# Patient Record
Sex: MALE | Race: WHITE | NOT HISPANIC OR LATINO | Employment: FULL TIME | URBAN - METROPOLITAN AREA
[De-identification: names, ages, dates, MRNs, and addresses within clinical notes are randomized per-mention and may not be internally consistent; named-entity substitution may affect disease eponyms.]

---

## 2017-11-08 ENCOUNTER — ALLSCRIPTS OFFICE VISIT (OUTPATIENT)
Dept: OTHER | Facility: OTHER | Age: 32
End: 2017-11-08

## 2017-11-08 ENCOUNTER — GENERIC CONVERSION - ENCOUNTER (OUTPATIENT)
Dept: OTHER | Facility: OTHER | Age: 32
End: 2017-11-08

## 2017-11-08 DIAGNOSIS — Z13.1 ENCOUNTER FOR SCREENING FOR DIABETES MELLITUS: ICD-10-CM

## 2017-11-08 DIAGNOSIS — Z13.220 ENCOUNTER FOR SCREENING FOR LIPOID DISORDERS: ICD-10-CM

## 2017-11-08 DIAGNOSIS — R07.9 CHEST PAIN: ICD-10-CM

## 2017-11-09 NOTE — PROGRESS NOTES
Assessment    1  Social drinker (V49 89) (Z78 9)   2  History of Cholecystectomy   3  History of chest pain (V13 89) (Z87 898)   4  Chest pain (786 50) (R07 9)   5  ADHD (attention deficit hyperactivity disorder) (314 01) (F90 9)   6  Depression (311) (F32 9)   7  Swelling of joint of right wrist (719 03) (M25 431)    Plan  Chest pain    · EKG/ECG- POC; Status:Complete;   Done: 55KMY1313 10:26AM    Discussion/Summary  Discussion Summary:   Chest pain- less likely cardiac in nature per history now resolved, normal EKG, he was advised if symptoms return to make an appt to be evaluated again  stable at this time advised to follow up with psychiatrist at this timewrist swelling- advised to monitor only at this time  up as needed  Medication SE Review and Pt Understands Tx: Possible side effects of new medications were reviewed with the patient/guardian today  The treatment plan was reviewed with the patient/guardian  The patient/guardian understands and agrees with the treatment plan      Chief Complaint  Chief Complaint Free Text Note Form: Patient is here for initial office visit  He stated he had chest pain about 2 weeks ago  History of Present Illness  HPI: Patient is here establish care due t chest pain, he says that he had a 6/10 chest pain that lasted 10 minutes that occurred 3 weeks ago only once, sharp and stabbing pain that occurred while he was standing at work  He denies any associated symptoms related to it sees a psychiatrist, Dr Lowell Price for ADHD and depression, he takes Wellbutrin 100 mg PO once daily  He also takes cholestyramine due to gallbladder removal he has noticed a lump on his right wrist that he noticed some numbness and tingling which went away  Review of Systems  Complete-Male:  Constitutional: No fever or chills, feels well, no tiredness, no recent weight gain or weight loss  Eyes: No complaints of eye pain, no red eyes, no discharge from eyes, no itchy eyes    Cardiovascular: chest pain, but-- as noted in HPI  Respiratory: No complaints of shortness of breath, no wheezing, no cough, no SOB on exertion, no orthopnea or PND  Gastrointestinal: No complaints of abdominal pain, no constipation, no nausea or vomiting, no diarrhea or bloody stools  Musculoskeletal: as noted in HPI  Integumentary: No complaints of skin rash or skin lesions, no itching, no skin wound, no dry skin  Neurological: No compliants of headache, no confusion, no convulsions, no numbness or tingling, no dizziness or fainting, no limb weakness, no difficulty walking  Psychiatric: anxiety, but-- as noted in HPI  Endocrine: No complaints of proptosis, no hot flashes, no muscle weakness, no erectile dysfunction, no deepening of the voice, no feelings of weakness  Hematologic/Lymphatic: No complaints of swollen glands, no swollen glands in the neck, does not bleed easily, no easy bruising  ROS Reviewed:   ROS reviewed  Past Medical History  1  History of chest pain (V13 89) (T11 978)  Active Problems And Past Medical History Reviewed: The active problems and past medical history were reviewed and updated today  Surgical History  1  History of Cholecystectomy  Surgical History Reviewed: The surgical history was reviewed and updated today  Social History     · Never a smoker   · Social drinker (V49 89) (Z78 9)  Social History Reviewed: The social history was reviewed and updated today  The social history was reviewed and is unchanged  Current Meds   1  Cholestyramine 4 GM Oral Packet; Therapy: (Recorded:40Kwh1603) to Recorded   2  Wellbutrin  MG Oral Tablet Extended Release 12 Hour Recorded    Allergies  1   No Known Drug Allergies    Vitals  Vital Signs    Recorded: [id number] 10:17AM   Temperature 98 F   Heart Rate 84   Systolic 331   Diastolic 80   Height 5 ft 9 5 in   Weight 246 lb 7 04 oz   BMI Calculated 35 87   BSA Calculated 2 27   O2 Saturation 98       Physical Exam   Constitutional General appearance: No acute distress, well appearing and well nourished  Eyes  Conjunctiva and lids: No swelling, erythema, or discharge  Pulmonary  Respiratory effort: No increased work of breathing or signs of respiratory distress  Auscultation of lungs: Clear to auscultation, equal breath sounds bilaterally, no wheezes, no rales, no rhonci  Cardiovascular  Auscultation of heart: Normal rate and rhythm, normal S1 and S2, without murmurs  Musculoskeletal  Gait and station: Normal    Skin  Skin and subcutaneous tissue: Normal without rashes or lesions  Psychiatric  Orientation to person, place and time: Normal    Mood and affect: Normal          Results/Data  EKG/ECG- POC [id number] 10:26AM [facility] [facility]     Test Name Result Flag Reference   EKG/ECG see report         ECG: A 12 lead ECG was performed and was normal   Rhythm and rate: normal sinus rhythm  P-waves: the P wave is normal   QRS: the QRS is normal   ST segment: the ST segments are normal   Comparison to prior ECGs:  no prior ECGs were available for comparison        Signatures   Electronically signed by : Shabana Fuchs MD; Nov 8 2017 11:13AM EST                       (Author)

## 2018-01-13 VITALS
TEMPERATURE: 98 F | SYSTOLIC BLOOD PRESSURE: 122 MMHG | HEIGHT: 70 IN | BODY MASS INDEX: 35.28 KG/M2 | OXYGEN SATURATION: 98 % | WEIGHT: 246.44 LBS | DIASTOLIC BLOOD PRESSURE: 80 MMHG | HEART RATE: 84 BPM

## 2018-02-26 DIAGNOSIS — R07.89 OTHER CHEST PAIN: Primary | ICD-10-CM

## 2018-04-05 RX ORDER — BUPROPION HYDROCHLORIDE 100 MG/1
TABLET, EXTENDED RELEASE ORAL
COMMUNITY
End: 2022-01-19

## 2018-04-05 RX ORDER — CHOLESTYRAMINE 4 G/9G
POWDER, FOR SUSPENSION ORAL
COMMUNITY
End: 2022-01-19

## 2018-04-10 ENCOUNTER — OFFICE VISIT (OUTPATIENT)
Dept: GASTROENTEROLOGY | Facility: CLINIC | Age: 33
End: 2018-04-10
Payer: COMMERCIAL

## 2018-04-10 VITALS
SYSTOLIC BLOOD PRESSURE: 118 MMHG | WEIGHT: 235.5 LBS | BODY MASS INDEX: 34.28 KG/M2 | DIASTOLIC BLOOD PRESSURE: 90 MMHG | HEART RATE: 94 BPM

## 2018-04-10 DIAGNOSIS — R07.89 OTHER CHEST PAIN: ICD-10-CM

## 2018-04-10 DIAGNOSIS — R07.9 CHEST PAIN, UNSPECIFIED TYPE: Primary | ICD-10-CM

## 2018-04-10 PROCEDURE — 99244 OFF/OP CNSLTJ NEW/EST MOD 40: CPT | Performed by: INTERNAL MEDICINE

## 2018-04-10 RX ORDER — DEXTROAMPHETAMINE SACCHARATE, AMPHETAMINE ASPARTATE MONOHYDRATE, DEXTROAMPHETAMINE SULFATE AND AMPHETAMINE SULFATE 7.5; 7.5; 7.5; 7.5 MG/1; MG/1; MG/1; MG/1
30 CAPSULE, EXTENDED RELEASE ORAL EVERY MORNING
COMMUNITY
End: 2022-01-19

## 2018-04-10 NOTE — LETTER
April 10, 2018     Burak Desai, [address]  [address] [facility]  [address]    Patient: Bharati Orr   YOB: 1985   Date of Visit: 4/10/2018       Dear Dr Lauren Dockery:    Thank you for referring Bharati Orr to me for evaluation  Below are my notes for this consultation  If you have questions, please do not hesitate to call me  I look forward to following your patient along with you  Sincerely,        Ivana Elias MD        CC: No Recipients  Ivana Elias MD  4/10/2018  2:35 PM  Sign at close encounter  Consultation - [address] [facility]  Bharati Orr 1985 34 y o  male     ASSESSMENT @ PLAN:   50-year-old male with an episode last month of 3 days of chest pain  He does not have overt peptic symptoms and rarely has chest pain  He had negative cardiovascular evaluation he does have a bile salt diarrhea after getting his gallbladder out 5 years ago and has to take cholestyramine  1 will do EGD to investigate    2 I told him that he needs to improve his diet  I will need to talk to his wife about this at the procedure    Chief Complaint:  Chest pain    HPI:  He is a 50-year-old man with not eating cardiac chest pain  He had 3 days of severe chest pain and went to the emergency room and was evaluated  He reports that he had a negative cardiovascular evaluation  It is not exertional   He has no dysphagia nausea vomiting  He does have a very sensitive stomach reports that he has diarrhea if he eats bad food he does cholestyramine for bile salt diarrhea after getting his gallbladder out 5 years ago  He has no heartburn dysphagia  He has no NSAID use  He has gained weight  He has nobody in the family with Crohn's disease or ulcerative colitis  He did not get a PPI trial   He eats very bad food  REVIEW OF SYSTEMS:     CONSTITUTIONAL: Denies any fever, chills, or rigors  Good appetite, and no recent weight loss  HEENT: No earache or tinnitus  Denies hearing loss or visual disturbances  CARDIOVASCULAR: No chest pain or palpitations  RESPIRATORY: Denies any cough, hemoptysis, shortness of breath or dyspnea on exertion  GASTROINTESTINAL: As noted in the History of Present Illness  GENITOURINARY: No problems with urination  Denies any hematuria or dysuria  NEUROLOGIC: No dizziness or vertigo, denies headaches  MUSCULOSKELETAL: Denies any muscle or joint pain  SKIN: Denies skin rashes or itching  ENDOCRINE: Denies excessive thirst  Denies intolerance to heat or cold  PSYCHOSOCIAL: Denies depression or anxiety  Denies any recent memory loss  History reviewed  No pertinent past medical history  Past Surgical History:   Procedure Laterality Date    CHOLECYSTECTOMY       Social History     Social History    Marital status: /Civil Union     Spouse name: N/A    Number of children: N/A    Years of education: N/A     Occupational History    Not on file  Social History Main Topics    Smoking status: Never Smoker    Smokeless tobacco: Never Used    Alcohol use Yes      Comment: occasional    Drug use: Yes     Types: Marijuana    Sexual activity: Not on file     Other Topics Concern    Not on file     Social History Narrative    No narrative on file     Family History   Problem Relation Age of Onset    No Known Problems Mother     No Known Problems Father      Patient has no known allergies  Current Outpatient Prescriptions   Medication Sig Dispense Refill    amphetamine-dextroamphetamine (ADDERALL XR, 30MG,) 30 MG 24 hr capsule Take 30 mg by mouth every morning      buPROPion (WELLBUTRIN SR) 100 mg 12 hr tablet Take by mouth      cholestyramine (QUESTRAN) 4 GM/DOSE powder Take by mouth       No current facility-administered medications for this visit  Blood pressure 118/90, pulse 94, weight 107 kg (235 lb 8 oz)      PHYSICAL EXAM:     General Appearance:   Alert, cooperative, no distress, appears stated age  HEENT:   Normocephalic, atraumatic, anicteric      Neck:  Supple, symmetrical, trachea midline, no adenopathy;    thyroid: no enlargement/tenderness/nodules; no carotid  bruit or JVD    Lungs:   Clear to auscultation bilaterally; no rales, rhonchi or wheezing; respirations unlabored    Heart[de-identified]   S1 and S2 normal; regular rate and rhythm; no murmur, rub, or gallop     Abdomen:   Soft, non-tender, non-distended; normal bowel sounds; no masses, no organomegaly    Genitalia:   Deferred    Rectal:   Deferred    Extremities:  No cyanosis, clubbing or edema    Pulses:  2+ and symmetric all extremities    Skin:  Skin color, texture, turgor normal, no rashes or lesions    Lymph nodes:  No palpable cervical, axillary or inguinal lymphadenopathy        No results found for: WBC, HGB, HCT, MCV, PLT  No results found for: GLUCOSE, CALCIUM, NA, K, CO2, CL, BUN, CREATININE  No results found for: ALT, AST, GGT, ALKPHOS, BILITOT  No results found for: INR, PROTIME

## 2018-04-10 NOTE — PROGRESS NOTES
Consultation - [address] [facility]  Tiff Jung 1985 34 y o  male     ASSESSMENT @ PLAN:   21-year-old male with an episode last month of 3 days of chest pain  He does not have overt peptic symptoms and rarely has chest pain  He had negative cardiovascular evaluation he does have a bile salt diarrhea after getting his gallbladder out 5 years ago and has to take cholestyramine  1 will do EGD to investigate    2 I told him that he needs to improve his diet  I will need to talk to his wife about this at the procedure    Chief Complaint:  Chest pain    HPI:  He is a 21-year-old man with not eating cardiac chest pain  He had 3 days of severe chest pain and went to the emergency room and was evaluated  He reports that he had a negative cardiovascular evaluation  It is not exertional   He has no dysphagia nausea vomiting  He does have a very sensitive stomach reports that he has diarrhea if he eats bad food he does cholestyramine for bile salt diarrhea after getting his gallbladder out 5 years ago  He has no heartburn dysphagia  He has no NSAID use  He has gained weight  He has nobody in the family with Crohn's disease or ulcerative colitis  He did not get a PPI trial   He eats very bad food  REVIEW OF SYSTEMS:     CONSTITUTIONAL: Denies any fever, chills, or rigors  Good appetite, and no recent weight loss  HEENT: No earache or tinnitus  Denies hearing loss or visual disturbances  CARDIOVASCULAR: No chest pain or palpitations  RESPIRATORY: Denies any cough, hemoptysis, shortness of breath or dyspnea on exertion  GASTROINTESTINAL: As noted in the History of Present Illness  GENITOURINARY: No problems with urination  Denies any hematuria or dysuria  NEUROLOGIC: No dizziness or vertigo, denies headaches  MUSCULOSKELETAL: Denies any muscle or joint pain  SKIN: Denies skin rashes or itching  ENDOCRINE: Denies excessive thirst  Denies intolerance to heat or cold    PSYCHOSOCIAL: Denies depression or anxiety  Denies any recent memory loss  History reviewed  No pertinent past medical history  Past Surgical History:   Procedure Laterality Date    CHOLECYSTECTOMY       Social History     Social History    Marital status: /Civil Union     Spouse name: N/A    Number of children: N/A    Years of education: N/A     Occupational History    Not on file  Social History Main Topics    Smoking status: Never Smoker    Smokeless tobacco: Never Used    Alcohol use Yes      Comment: occasional    Drug use: Yes     Types: Marijuana    Sexual activity: Not on file     Other Topics Concern    Not on file     Social History Narrative    No narrative on file     Family History   Problem Relation Age of Onset    No Known Problems Mother     No Known Problems Father      Patient has no known allergies  Current Outpatient Prescriptions   Medication Sig Dispense Refill    amphetamine-dextroamphetamine (ADDERALL XR, 30MG,) 30 MG 24 hr capsule Take 30 mg by mouth every morning      buPROPion (WELLBUTRIN SR) 100 mg 12 hr tablet Take by mouth      cholestyramine (QUESTRAN) 4 GM/DOSE powder Take by mouth       No current facility-administered medications for this visit  Blood pressure 118/90, pulse 94, weight 107 kg (235 lb 8 oz)  PHYSICAL EXAM:     General Appearance:   Alert, cooperative, no distress, appears stated age    HEENT:   Normocephalic, atraumatic, anicteric      Neck:  Supple, symmetrical, trachea midline, no adenopathy;    thyroid: no enlargement/tenderness/nodules; no carotid  bruit or JVD    Lungs:   Clear to auscultation bilaterally; no rales, rhonchi or wheezing; respirations unlabored    Heart[de-identified]   S1 and S2 normal; regular rate and rhythm; no murmur, rub, or gallop     Abdomen:   Soft, non-tender, non-distended; normal bowel sounds; no masses, no organomegaly    Genitalia:   Deferred    Rectal:   Deferred    Extremities:  No cyanosis, clubbing or edema  Pulses:  2+ and symmetric all extremities    Skin:  Skin color, texture, turgor normal, no rashes or lesions    Lymph nodes:  No palpable cervical, axillary or inguinal lymphadenopathy        No results found for: WBC, HGB, HCT, MCV, PLT  No results found for: GLUCOSE, CALCIUM, NA, K, CO2, CL, BUN, CREATININE  No results found for: ALT, AST, GGT, ALKPHOS, BILITOT  No results found for: INR, PROTIME

## 2018-04-18 ENCOUNTER — TELEPHONE (OUTPATIENT)
Dept: GASTROENTEROLOGY | Facility: CLINIC | Age: 33
End: 2018-04-18

## 2018-04-18 ENCOUNTER — ANESTHESIA EVENT (OUTPATIENT)
Dept: PERIOP | Facility: HOSPITAL | Age: 33
End: 2018-04-18
Payer: COMMERCIAL

## 2018-04-18 ENCOUNTER — HOSPITAL ENCOUNTER (OUTPATIENT)
Facility: HOSPITAL | Age: 33
Setting detail: OUTPATIENT SURGERY
Discharge: HOME | End: 2018-04-18
Attending: INTERNAL MEDICINE | Admitting: INTERNAL MEDICINE
Payer: COMMERCIAL

## 2018-04-18 ENCOUNTER — ANESTHESIA (OUTPATIENT)
Dept: PERIOP | Facility: HOSPITAL | Age: 33
End: 2018-04-18
Payer: COMMERCIAL

## 2018-04-18 VITALS
TEMPERATURE: 98.2 F | SYSTOLIC BLOOD PRESSURE: 145 MMHG | DIASTOLIC BLOOD PRESSURE: 97 MMHG | HEIGHT: 70 IN | OXYGEN SATURATION: 98 % | HEART RATE: 65 BPM | RESPIRATION RATE: 16 BRPM | WEIGHT: 227.74 LBS | BODY MASS INDEX: 32.6 KG/M2

## 2018-04-18 DIAGNOSIS — R07.9 CHEST PAIN, UNSPECIFIED TYPE: ICD-10-CM

## 2018-04-18 PROCEDURE — 88305 TISSUE EXAM BY PATHOLOGIST: CPT | Performed by: PATHOLOGY

## 2018-04-18 PROCEDURE — 88342 IMHCHEM/IMCYTCHM 1ST ANTB: CPT | Performed by: PATHOLOGY

## 2018-04-18 PROCEDURE — 43239 EGD BIOPSY SINGLE/MULTIPLE: CPT | Performed by: INTERNAL MEDICINE

## 2018-04-18 RX ORDER — PANTOPRAZOLE SODIUM 40 MG/1
40 TABLET, DELAYED RELEASE ORAL DAILY
Qty: 30 TABLET | Refills: 3 | Status: SHIPPED | OUTPATIENT
Start: 2018-04-18 | End: 2018-04-18 | Stop reason: SDUPTHER

## 2018-04-18 RX ORDER — SODIUM CHLORIDE, SODIUM LACTATE, POTASSIUM CHLORIDE, CALCIUM CHLORIDE 600; 310; 30; 20 MG/100ML; MG/100ML; MG/100ML; MG/100ML
50 INJECTION, SOLUTION INTRAVENOUS CONTINUOUS
Status: DISCONTINUED | OUTPATIENT
Start: 2018-04-18 | End: 2018-04-18

## 2018-04-18 RX ORDER — PANTOPRAZOLE SODIUM 40 MG/1
40 TABLET, DELAYED RELEASE ORAL DAILY
Qty: 30 TABLET | Refills: 0 | Status: SHIPPED | OUTPATIENT
Start: 2018-04-18 | End: 2018-05-22 | Stop reason: SDUPTHER

## 2018-04-18 RX ORDER — LIDOCAINE HYDROCHLORIDE 10 MG/ML
INJECTION, SOLUTION INFILTRATION; PERINEURAL AS NEEDED
Status: DISCONTINUED | OUTPATIENT
Start: 2018-04-18 | End: 2018-04-18 | Stop reason: SURG

## 2018-04-18 RX ORDER — PROPOFOL 10 MG/ML
INJECTION, EMULSION INTRAVENOUS AS NEEDED
Status: DISCONTINUED | OUTPATIENT
Start: 2018-04-18 | End: 2018-04-18 | Stop reason: SURG

## 2018-04-18 RX ADMIN — PROPOFOL 50 MG: 10 INJECTION, EMULSION INTRAVENOUS at 10:00

## 2018-04-18 RX ADMIN — PROPOFOL 50 MG: 10 INJECTION, EMULSION INTRAVENOUS at 10:02

## 2018-04-18 RX ADMIN — PROPOFOL 50 MG: 10 INJECTION, EMULSION INTRAVENOUS at 10:01

## 2018-04-18 RX ADMIN — LIDOCAINE HYDROCHLORIDE 50 MG: 10 INJECTION, SOLUTION INFILTRATION; PERINEURAL at 09:59

## 2018-04-18 RX ADMIN — SODIUM CHLORIDE, SODIUM LACTATE, POTASSIUM CHLORIDE, AND CALCIUM CHLORIDE: .6; .31; .03; .02 INJECTION, SOLUTION INTRAVENOUS at 09:42

## 2018-04-18 RX ADMIN — PROPOFOL 50 MG: 10 INJECTION, EMULSION INTRAVENOUS at 09:59

## 2018-04-18 NOTE — ANESTHESIA POSTPROCEDURE EVALUATION
Post-Op Assessment Note      CV Status:  Stable    Mental Status:  Somnolent    Hydration Status:  Stable    PONV Controlled:  None    Airway Patency:  Patent    Post Op Vitals Reviewed: Yes          Staff: CRNA           BP   121/80   Temp      Pulse  92   Resp   24   SpO2   97

## 2018-04-18 NOTE — ANESTHESIA PREPROCEDURE EVALUATION
Review of Systems/Medical History  Patient summary reviewed  Chart reviewed  No history of anesthetic complications     Cardiovascular  Exercise tolerance: good,     Pulmonary       GI/Hepatic            Endo/Other     GYN       Hematology   Musculoskeletal       Neurology   Psychology           Physical Exam    Airway    Mallampati score: II  TM Distance: >3 FB  Neck ROM: full     Dental   No notable dental hx     Cardiovascular      Pulmonary      Other Findings        Anesthesia Plan  ASA Score- 2     Anesthesia Type- IV sedation with anesthesia with ASA Monitors  Additional Monitors:   Airway Plan:     Comment: Recent negative EST at [facility] per pt        Plan Factors-    Induction- intravenous  Postoperative Plan-     Informed Consent- Anesthetic plan and risks discussed with patient  I personally reviewed this patient with the CRNA  Discussed and agreed on the Anesthesia Plan with the CRNA  Rosi Joyce

## 2018-04-18 NOTE — OP NOTE
**** GI/ENDOSCOPY REPORT ****     PATIENT NAME: RONALDO MYERS - VISIT ID:  Patient ID: [id number]   YOB: 1985     INTRODUCTION: Esophagogastroduodenoscopy - A 34 male patient presents for   an outpatient Esophagogastroduodenoscopy at [facility]  INDICATIONS: Chest pain  CONSENT: The benefits, risks, and alternatives to the procedure were   discussed and informed consent was obtained from the patient  PREPARATION:  EKG, pulse, pulse oximetry and blood pressure were monitored   throughout the procedure  ASA Classification: Class 2 - Patient has mild   to moderate systemic disturbance that may or may not be related to the   disorder requiring surgery  The patient was kept NPO for eight hours prior   to the procedure  MEDICATIONS: MAC anesthesia  PROCEDURE:  The endoscope was passed without difficulty through the mouth   under direct visualization and advanced to the 2nd portion of the   duodenum  The scope was withdrawn and the mucosa was carefully examined  FINDINGS:   Esophagus: LA Class B erosive reflux-induced esophagitis was   found in the esophagus  Stomach: The fundus and cardia appeared to be   normal  Severe erosive gastritis was found in the antrum and body of the   stomach  Multiple biopsies was taken  Duodenum: The duodenal bulb and 2nd   portion of the duodenum appeared to be normal  Multiple random biopsies   was taken  COMPLICATIONS: There were no complications  IMPRESSIONS: Esophagitis seen  Normal fundus and cardia  Severe erosive   gastritis found in the antrum and body of the stomach  Multiple biopsies   taken  Normal duodenal bulb and 2nd portion of the duodenum  Multiple   biopsies taken  RECOMMENDATIONS: Protonix 40mg QAM for 3mths  Anti-reflux measures: Raise   the head of the bed 4 to 6 inches  Avoid smoking  Avoid excess coffee, tea   or other caffeinated beverages  Avoid garments that fit tightly through   the abdomen  Avoid eating before bed  Follow-up on the results of the   biopsy specimens in 1 week  ESTIMATED BLOOD LOSS: None  PATHOLOGY SPECIMENS: Multiple biopsies taken  Associated finding:   Gastritis  Multiple random biopsies taken  Yes     PROCEDURE CODES: 44776 - EGD flexible; with biopsy     ICD-9 Codes: 786 50 Chest pain, unspecified 535 40 Other specified   gastritides, without mention of hemorrhage     ICD-10 Codes: R07 9 Chest pain, unspecified K20 9 Esophagitis, unspecified   K29 Gastritis and duodenitis     PERFORMED BY: JESUS Hannon  on 04/18/2018  Version 1, electronically signed by JESUS Piña  on   04/18/2018 at 10:06

## 2018-04-18 NOTE — DISCHARGE INSTRUCTIONS
Upper Endoscopy   WHAT YOU NEED TO KNOW:   An upper endoscopy is also called an upper gastrointestinal (GI) endoscopy, or an esophagogastroduodenoscopy (EGD)  You may feel bloated, gassy, or have some abdominal discomfort after your procedure  Your throat may be sore for 24 to 36 hours  You may burp or pass gas from air that is still inside your body  DISCHARGE INSTRUCTIONS:   Call 911 if:   · You have sudden chest pain or trouble breathing  Seek care immediately if:   · You feel dizzy or faint  · You have trouble swallowing  · You have severe throat pain  · Your bowel movements are very dark or black  · Your abdomen is hard and firm and you have severe pain  · You vomit blood  Contact your healthcare provider if:   · You feel full or bloated and cannot burp or pass gas  · You have not had a bowel movement for 3 days after your procedure  · You have neck pain  · You have a fever or chills  · You have nausea or are vomiting  · You have a rash or hives  · You have questions or concerns about your endoscopy  Relieve a sore throat:  Suck on throat lozenges or crushed ice  Gargle with a small amount of warm salt water  Mix 1 teaspoon of salt and 1 cup of warm water to make salt water  Relieve gas and discomfort from bloating:  Lie on your right side with a heating pad on your abdomen  Take short walks to help pass gas  Eat small meals until bloating is relieved  Rest after your procedure:  Do not drive or make important decisions until the day after your procedure  Return to your normal activity as directed  You can usually return to work the day after your procedure  Follow up with your healthcare provider as directed:  Write down your questions so you remember to ask them during your visits  © 2017 [employer]  Information is for End User's use only and may not be sold, redistributed or otherwise used for commercial purposes   All illustrations and images included in [employer] are the copyrighted property of [employer]  or [employer]  The above information is an  only  It is not intended as medical advice for individual conditions or treatments  Talk to your doctor, nurse or pharmacist before following any medical regimen to see if it is safe and effective for you

## 2018-05-22 ENCOUNTER — TELEPHONE (OUTPATIENT)
Dept: GASTROENTEROLOGY | Facility: CLINIC | Age: 33
End: 2018-05-22

## 2018-05-22 DIAGNOSIS — R07.9 CHEST PAIN, UNSPECIFIED TYPE: ICD-10-CM

## 2018-05-22 RX ORDER — PANTOPRAZOLE SODIUM 40 MG/1
40 TABLET, DELAYED RELEASE ORAL DAILY
Qty: 30 TABLET | Refills: 0 | Status: SHIPPED | OUTPATIENT
Start: 2018-05-22 | End: 2018-06-24 | Stop reason: SDUPTHER

## 2018-06-24 DIAGNOSIS — R07.9 CHEST PAIN, UNSPECIFIED TYPE: ICD-10-CM

## 2018-06-25 RX ORDER — PANTOPRAZOLE SODIUM 40 MG/1
40 TABLET, DELAYED RELEASE ORAL DAILY
Qty: 30 TABLET | Refills: 0 | Status: SHIPPED | OUTPATIENT
Start: 2018-06-25 | End: 2018-10-17

## 2018-07-19 ENCOUNTER — OFFICE VISIT (OUTPATIENT)
Dept: URGENT CARE | Facility: CLINIC | Age: 33
End: 2018-07-19
Payer: COMMERCIAL

## 2018-07-19 VITALS
OXYGEN SATURATION: 98 % | TEMPERATURE: 98.9 F | DIASTOLIC BLOOD PRESSURE: 74 MMHG | WEIGHT: 218 LBS | HEART RATE: 80 BPM | SYSTOLIC BLOOD PRESSURE: 118 MMHG | RESPIRATION RATE: 18 BRPM | BODY MASS INDEX: 32.29 KG/M2 | HEIGHT: 69 IN

## 2018-07-19 DIAGNOSIS — J02.9 ACUTE PHARYNGITIS, UNSPECIFIED ETIOLOGY: Primary | ICD-10-CM

## 2018-07-19 LAB — S PYO AG THROAT QL: NEGATIVE

## 2018-07-19 PROCEDURE — 99283 EMERGENCY DEPT VISIT LOW MDM: CPT | Performed by: PHYSICIAN ASSISTANT

## 2018-07-19 PROCEDURE — 87430 STREP A AG IA: CPT | Performed by: PHYSICIAN ASSISTANT

## 2018-07-19 PROCEDURE — G0382 LEV 3 HOSP TYPE B ED VISIT: HCPCS | Performed by: PHYSICIAN ASSISTANT

## 2018-07-19 RX ORDER — AZITHROMYCIN 250 MG/1
TABLET, FILM COATED ORAL
Qty: 6 TABLET | Refills: 0 | Status: SHIPPED | OUTPATIENT
Start: 2018-07-19 | End: 2018-07-23

## 2018-07-19 NOTE — PATIENT INSTRUCTIONS
1  Pharyngitis  -Rapid strep test was negative however I have high suspicion for strep therefore I am treating  -Use tylenol/motrin as directed  -Salt H20 gargles/throat lozenges  -Increase fluids  -Follow-up with PCP within 5-7 days    Go to ER with worsening symptoms, worsening pain, high fever, difficulty swallowing, or any signs of distress    Pharyngitis   AMBULATORY CARE:   Pharyngitis , or sore throat, is inflammation of the tissues and structures in your pharynx (throat)  Pharyngitis is most often caused by bacteria  It may also be caused by a cold or flu virus  Other causes include smoking, allergies, or acid reflux  Signs and symptoms that may occur with pharyngitis:   · Sore throat or pain when you swallow    · Fever, chills, and body aches    · Hoarse or raspy voice    · Cough, runny or stuffy nose, itchy or watery eyes    · Headache    · Upset stomach and loss of appetite    · Mild neck stiffness    · Swollen glands that feel like hard lumps when you touch your neck    · White and yellow pus-filled blisters in the back of your throat  Call 911 for any of the following:   · You have trouble breathing or swallowing because your throat is swollen or sore  Seek care immediately if:   · You are drooling because it hurts too much to swallow  · Your fever is higher than 102? F (39?C) or lasts longer than 3 days  · You are confused  · You taste blood in your throat  Contact your healthcare provider if:   · Your throat pain gets worse  · You have a painful lump in your throat that does not go away after 5 days  · Your symptoms do not improve after 5 days  · You have questions or concerns about your condition or care  Treatment for pharyngitis:  Viral pharyngitis will go away on its own without treatment  Your sore throat should start to feel better in 3 to 5 days for both viral and bacterial infections   You may need any of the following:  · Antibiotics  treat a bacterial infection  · NSAIDs , such as ibuprofen, help decrease swelling, pain, and fever  NSAIDs can cause stomach bleeding or kidney problems in certain people  If you take blood thinner medicine, always ask your healthcare provider if NSAIDs are safe for you  Always read the medicine label and follow directions  · Acetaminophen  decreases pain and fever  It is available without a doctor's order  Ask how much to take and how often to take it  Follow directions  Acetaminophen can cause liver damage if not taken correctly  Manage your symptoms:   · Gargle salt water  Mix ¼ teaspoon salt in an 8 ounce glass of warm water and gargle  This may help decrease swelling in your throat  · Drink liquids as directed  You may need to drink more liquids than usual  Liquids may help soothe your throat and prevent dehydration  Ask how much liquid to drink each day and which liquids are best for you  · Use a cool-steam humidifier  to help moisten the air in your room and calm your cough  · Soothe your throat  with cough drops, ice, soft foods, or popsicles  Prevent the spread of pharyngitis:  Cover your mouth and nose when you cough or sneeze  Do not share food or drinks  Wash your hands often  Use soap and water  If soap and water are unavailable, use an alcohol based hand   Follow up with your healthcare provider as directed:  Write down your questions so you remember to ask them during your visits  © 2017 [facility] Information is for End User's use only and may not be sold, redistributed or otherwise used for commercial purposes  All illustrations and images included in CareNotes® are the copyrighted property of A D A M , Inc  or [employer] Coronado  The above information is an  only  It is not intended as medical advice for individual conditions or treatments   Talk to your doctor, nurse or pharmacist before following any medical regimen to see if it is safe and effective for you

## 2018-07-19 NOTE — PROGRESS NOTES
[facility]        NAME: Sofya Conner is a 34 y o  male  : 1985    MRN: [id number]  DATE: 2018  TIME: 1:07 PM    Assessment and Plan   Acute pharyngitis, unspecified etiology [J02 9]  1  Acute pharyngitis, unspecified etiology  azithromycin (ZITHROMAX) 250 mg tablet    POCT rapid strepA         Patient Instructions     1  Pharyngitis  -Rapid strep test was negative however I have high suspicion for strep therefore I am treating  -Use tylenol/motrin as directed  -Salt H20 gargles/throat lozenges  -Increase fluids  -Follow-up with PCP within 5-7 days    Go to ER with worsening symptoms, worsening pain, high fever, difficulty swallowing, or any signs of distress      Chief Complaint     Chief Complaint   Patient presents with    Sore Throat     x 24 hrs  Took mucinex w/no relief    Nasal Congestion    Ear Fullness         History of Present Illness       Patient is a 76-year-old male who presents today for evaluation of a sore throat that started yesterday  Patient rates his pain as a 4/10  He also admits to having some nasal congestion and ear fullness  He took Mucinex without much relief  He is unsure of any sick contacts  Review of Systems   Review of Systems   Constitutional: Negative for chills and fever  HENT: Positive for rhinorrhea and sore throat  Negative for ear pain  Respiratory: Negative for shortness of breath  Cardiovascular: Negative for chest pain  Musculoskeletal: Negative for arthralgias  Neurological: Negative for headaches           Current Medications       Current Outpatient Prescriptions:     amphetamine-dextroamphetamine (ADDERALL XR, 30MG,) 30 MG 24 hr capsule, Take 30 mg by mouth every morning, Disp: , Rfl:     azithromycin (ZITHROMAX) 250 mg tablet, Take 2 tablets today then 1 tablet daily x 4 days, Disp: 6 tablet, Rfl: 0    buPROPion (WELLBUTRIN SR) 100 mg 12 hr tablet, Take by mouth, Disp: , Rfl:     cholestyramine (QUESTRAN) 4 GM/DOSE powder, Take by mouth, Disp: , Rfl:     pantoprazole (PROTONIX) 40 mg tablet, TAKE 1 TABLET (40 MG TOTAL) BY MOUTH DAILY, Disp: 30 tablet, Rfl: 0    Current Allergies     Allergies as of 07/19/2018    (No Known Allergies)            The following portions of the patient's history were reviewed and updated as appropriate: allergies, current medications, past family history, past medical history, past social history, past surgical history and problem list      No past medical history on file  Past Surgical History:   Procedure Laterality Date    CHOLECYSTECTOMY      [place] ESOPHAGOGASTRODUODENOSCOPY TRANSORAL DIAGNOSTIC N/A 4/18/2018    Procedure: ESOPHAGOGASTRODUODENOSCOPY (EGD); Surgeon: Ale Gonzalez MD;  Location: [facility]; Service: Gastroenterology       Family History   Problem Relation Age of Onset    No Known Problems Mother     No Known Problems Father          Medications have been verified  Objective   /74   Pulse 80   Temp 98 9 °F (37 2 °C) (Temporal)   Resp 18   Ht 5' 9" (1 753 m)   Wt 98 9 kg (218 lb)   SpO2 98%   BMI 32 19 kg/m²        Physical Exam     Physical Exam   Constitutional: He is oriented to person, place, and time  He appears well-developed and well-nourished  No distress  HENT:   Right Ear: Tympanic membrane and external ear normal    Left Ear: Tympanic membrane and external ear normal    Nose: Nose normal    Mouth/Throat:       Cardiovascular: Normal rate, regular rhythm and normal heart sounds  Pulmonary/Chest: Effort normal and breath sounds normal  He has no wheezes  He has no rales  Neurological: He is alert and oriented to person, place, and time  Skin: Skin is warm and dry  Psychiatric: He has a normal mood and affect  Nursing note and vitals reviewed

## 2018-10-17 ENCOUNTER — OFFICE VISIT (OUTPATIENT)
Dept: URGENT CARE | Facility: CLINIC | Age: 33
End: 2018-10-17
Payer: COMMERCIAL

## 2018-10-17 VITALS
SYSTOLIC BLOOD PRESSURE: 130 MMHG | DIASTOLIC BLOOD PRESSURE: 90 MMHG | WEIGHT: 230 LBS | BODY MASS INDEX: 32.93 KG/M2 | OXYGEN SATURATION: 97 % | TEMPERATURE: 99.3 F | HEIGHT: 70 IN | RESPIRATION RATE: 16 BRPM | HEART RATE: 78 BPM

## 2018-10-17 DIAGNOSIS — S05.8X1A ABRASION OF SCLERA OF RIGHT EYE, INITIAL ENCOUNTER: Primary | ICD-10-CM

## 2018-10-17 PROCEDURE — G0382 LEV 3 HOSP TYPE B ED VISIT: HCPCS | Performed by: PHYSICIAN ASSISTANT

## 2018-10-17 PROCEDURE — 99283 EMERGENCY DEPT VISIT LOW MDM: CPT | Performed by: PHYSICIAN ASSISTANT

## 2018-10-17 RX ORDER — PROPARACAINE HYDROCHLORIDE 5 MG/ML
1 SOLUTION/ DROPS OPHTHALMIC ONCE
Status: COMPLETED | OUTPATIENT
Start: 2018-10-17 | End: 2018-10-17

## 2018-10-17 RX ORDER — TOBRAMYCIN 3 MG/ML
1 SOLUTION/ DROPS OPHTHALMIC
Qty: 1 BOTTLE | Refills: 0 | Status: SHIPPED | OUTPATIENT
Start: 2018-10-17 | End: 2018-10-24

## 2018-10-17 RX ADMIN — PROPARACAINE HYDROCHLORIDE 1 DROP: 5 SOLUTION/ DROPS OPHTHALMIC at 10:39

## 2018-10-17 NOTE — PATIENT INSTRUCTIONS
1  Abrasion of sclera  -Use eye drops as directed  -Do not wear contacts  -Follow-up with eye doctor for re-evaluation within 1-2 days    Go to ER with worsening symptoms, worsening pain, visual changes, fever, headache, vomiting, or any new concerns    Corneal Abrasion   AMBULATORY CARE:   A corneal abrasion  is a scratch on the cornea of your eye  The cornea is the clear layer that covers the front of your eye  A small scratch may heal in 1 to 2 days  Deeper or larger scratches may take longer to heal         Common signs and symptoms include the following:   · Pain    · More tears than usual    · Redness    · A feeling that you have something in your eye    · Blurred vision    · Sensitivity to bright light    · Headache  Contact your healthcare provider if:   · Your eye pain or vision gets worse  · You have yellow or green drainage from your eye  · You have questions or concerns about your condition or care  Treatment for a corneal abrasion  may include antibiotic eyedrops or ointment to help prevent an eye infection  You may also be given eye drops to decrease pain  Do not rub your eyes  Do not wear contact lenses until your healthcare provider tells you that it is okay  Wear sunglasses in bright light until your eyes feel better  Prevent corneal abrasions:   · Remove your contact lenses if your eyes feel dry or irritated  · Wash your hands if you need to touch your eyes or your face  · Trim your child's fingernails so he cannot scratch his eye  · Wear protective eyewear when you work with chemicals, wood, dust, or metal      · Wear protective eyewear when you play sports  · Do not wear your contacts for longer than you should  · Do not wear colored lenses or lenses with shapes on them  These lenses may cause eye damage and vision loss  · Do not wear glitter makeup  Glitter can easily get into your eyes and under contact lenses       · Do not sleep with your contacts in your eyes   © 2017 [facility] Information is for End User's use only and may not be sold, redistributed or otherwise used for commercial purposes  All illustrations and images included in CareNotes® are the copyrighted property of A D A M , Inc  or [employer]  The above information is an  only  It is not intended as medical advice for individual conditions or treatments  Talk to your doctor, nurse or pharmacist before following any medical regimen to see if it is safe and effective for you

## 2018-10-17 NOTE — PROGRESS NOTES
[facility]        NAME: Amber Cesar is a 28 y o  male  : 1985    MRN: [id number]  DATE: 2018  TIME: 9:31 AM    Assessment and Plan   Abrasion of sclera of right eye, initial encounter [S05 [id number]]  1  Abrasion of sclera of right eye, initial encounter  tobramycin (TOBREX) 0 3 % SOLN         Patient Instructions     1  Abrasion of sclera  -Use eye drops as directed  -Do not wear contacts  -Follow-up with eye doctor for re-evaluation within 1-2 days    Go to ER with worsening symptoms, worsening pain, visual changes, fever, headache, vomiting, or any new concerns        Chief Complaint     Chief Complaint   Patient presents with    Eye Pain     right eye, red    Nasal Congestion         History of Present Illness       Patient is a 26-year-old male who presents today for evaluation of right eye pain  Patient states that the pain started last night after he took his contact out  Patient believes that he may have scratched his eye when doing so  Patient states that upon waking this morning, he continues to have pain and redness to his eye  he also admits to having sensitivity to light  Patient denies fever, chills, headache, visual changes or vomiting  Review of Systems   Review of Systems   Constitutional: Negative for chills and fever  HENT: Positive for rhinorrhea  Negative for ear pain and sore throat  Eyes: Positive for pain and redness  Negative for visual disturbance  Respiratory: Negative for shortness of breath  Cardiovascular: Negative for chest pain  Gastrointestinal: Negative for nausea and vomiting  Neurological: Negative for headaches           Current Medications       Current Outpatient Prescriptions:     amphetamine-dextroamphetamine (ADDERALL XR, 30MG,) 30 MG 24 hr capsule, Take 30 mg by mouth every morning, Disp: , Rfl:     buPROPion (WELLBUTRIN SR) 100 mg 12 hr tablet, Take by mouth, Disp: , Rfl:     cholestyramine (QUESTRAN) 4 GM/DOSE powder, Take by mouth, Disp: , Rfl:     tobramycin (TOBREX) 0 3 % SOLN, Administer 1 drop to the right eye every 4 (four) hours while awake for 7 days, Disp: 1 Bottle, Rfl: 0    Current Allergies     Allergies as of 10/17/2018    (No Known Allergies)            The following portions of the patient's history were reviewed and updated as appropriate: allergies, current medications, past family history, past medical history, past social history, past surgical history and problem list      Past Medical History:   Diagnosis Date    GERD (gastroesophageal reflux disease)        Past Surgical History:   Procedure Laterality Date    CHOLECYSTECTOMY      [place] ESOPHAGOGASTRODUODENOSCOPY TRANSORAL DIAGNOSTIC N/A 4/18/2018    Procedure: ESOPHAGOGASTRODUODENOSCOPY (EGD); Surgeon: Bella Macario MD;  Location: [facility]; Service: Gastroenterology       Family History   Problem Relation Age of Onset    No Known Problems Mother     No Known Problems Father          Medications have been verified  Objective   /90 (BP Location: Left arm, Patient Position: Sitting, Cuff Size: Standard)   Pulse 78   Temp 99 3 °F (37 4 °C) (Tympanic)   Resp 16   Ht 5' 10" (1 778 m)   Wt 104 kg (230 lb)   SpO2 97%   BMI 33 00 kg/m²        Physical Exam     Physical Exam   Constitutional: He is oriented to person, place, and time  He appears well-developed and well-nourished  No distress  HENT:   Mouth/Throat: Oropharynx is clear and moist    Eyes: Pupils are equal, round, and reactive to light  EOM are normal  Right conjunctiva is injected  Left conjunctiva is not injected  Slit lamp exam:       The right eye shows fluorescein uptake (+ fluorescein uptake on medial inferior sclera  No uptake seen on cornea)  Cardiovascular: Normal rate, regular rhythm and normal heart sounds  Pulmonary/Chest: Effort normal and breath sounds normal  He has no wheezes  He has no rales     Neurological: He is alert and oriented to person, place, and time  Skin: Skin is warm and dry  Psychiatric: He has a normal mood and affect  Nursing note and vitals reviewed        Procedures

## 2018-11-25 ENCOUNTER — OFFICE VISIT (OUTPATIENT)
Dept: URGENT CARE | Facility: CLINIC | Age: 33
End: 2018-11-25
Payer: COMMERCIAL

## 2018-11-25 VITALS
BODY MASS INDEX: 34.07 KG/M2 | SYSTOLIC BLOOD PRESSURE: 134 MMHG | HEIGHT: 70 IN | HEART RATE: 80 BPM | TEMPERATURE: 98 F | WEIGHT: 238 LBS | DIASTOLIC BLOOD PRESSURE: 90 MMHG | RESPIRATION RATE: 16 BRPM | OXYGEN SATURATION: 98 %

## 2018-11-25 DIAGNOSIS — J02.9 PHARYNGITIS, UNSPECIFIED ETIOLOGY: Primary | ICD-10-CM

## 2018-11-25 PROCEDURE — 99283 EMERGENCY DEPT VISIT LOW MDM: CPT | Performed by: PHYSICIAN ASSISTANT

## 2018-11-25 PROCEDURE — G0382 LEV 3 HOSP TYPE B ED VISIT: HCPCS | Performed by: PHYSICIAN ASSISTANT

## 2018-11-25 RX ORDER — AZITHROMYCIN 250 MG/1
TABLET, FILM COATED ORAL
Qty: 6 TABLET | Refills: 0 | Status: SHIPPED | OUTPATIENT
Start: 2018-11-25 | End: 2018-11-29

## 2018-11-25 NOTE — PROGRESS NOTES
[facility]        NAME: Yaw Elliott is a 28 y o  male  : 1985    MRN: [id number]  DATE: 2018  TIME: 8:25 AM    Assessment and Plan   Pharyngitis, unspecified etiology [J02 9]  1  Pharyngitis, unspecified etiology  azithromycin (ZITHROMAX) 250 mg tablet         Patient Instructions     1  Pharyngitis  -Patient's daughter has strep therefore I am treating  -Take antibiotic as directed  -Use tylenol/motrin as directed  -Increase fluids  -Salt H20 gargles/throat lozenges  -Follow-up with PCP within 5-7 days    Go to ER with worsening symptoms, worsening pain, high fever, difficulty swallowing/drooling, or any signs of distress    Chief Complaint     Chief Complaint   Patient presents with    Cold Like Symptoms     c/o nasal congestion    Sore Throat     started today, states his daughter has strep         History of Present Illness       Patient is a 59-year-old male who presents today for evaluation of a sore throat that started this morning  Patient states that last night his throat started to feel scratchy  He rates his pain as a 4/10  He also admits having some slight nasal congestion  Patient states that his daughter was diagnosed with strep throat yesterday  Review of Systems   Review of Systems   Constitutional: Negative for chills and fever  HENT: Positive for rhinorrhea and sore throat  Negative for ear pain  Respiratory: Negative for shortness of breath  Cardiovascular: Negative for chest pain  Musculoskeletal: Negative for arthralgias  Neurological: Negative for headaches           Current Medications       Current Outpatient Prescriptions:     cholestyramine (QUESTRAN) 4 GM/DOSE powder, Take by mouth, Disp: , Rfl:     amphetamine-dextroamphetamine (ADDERALL XR, 30MG,) 30 MG 24 hr capsule, Take 30 mg by mouth every morning, Disp: , Rfl:     azithromycin (ZITHROMAX) 250 mg tablet, Take 2 tablets today then 1 tablet daily x 4 days, Disp: 6 tablet, Rfl: 0    buPROPion (WELLBUTRIN SR) 100 mg 12 hr tablet, Take by mouth, Disp: , Rfl:     Current Allergies     Allergies as of 11/25/2018    (No Known Allergies)            The following portions of the patient's history were reviewed and updated as appropriate: allergies, current medications, past family history, past medical history, past social history, past surgical history and problem list      Past Medical History:   Diagnosis Date    GERD (gastroesophageal reflux disease)        Past Surgical History:   Procedure Laterality Date    CHOLECYSTECTOMY      [place] ESOPHAGOGASTRODUODENOSCOPY TRANSORAL DIAGNOSTIC N/A 4/18/2018    Procedure: ESOPHAGOGASTRODUODENOSCOPY (EGD); Surgeon: Dena Christian MD;  Location: [facility]; Service: Gastroenterology       Family History   Problem Relation Age of Onset    No Known Problems Mother     No Known Problems Father          Medications have been verified  Objective   /90   Pulse 80   Temp 98 °F (36 7 °C) (Temporal)   Resp 16   Ht 5' 10" (1 778 m)   Wt 108 kg (238 lb)   SpO2 98%   BMI 34 15 kg/m²        Physical Exam     Physical Exam   Constitutional: He is oriented to person, place, and time  He appears well-developed and well-nourished  No distress  HENT:   Right Ear: Tympanic membrane and external ear normal    Left Ear: Tympanic membrane and external ear normal    Nose: Nose normal    Mouth/Throat: Uvula is midline and mucous membranes are normal        Eyes: Pupils are equal, round, and reactive to light  Conjunctivae and EOM are normal    Neck: Normal range of motion  Neck supple  Cardiovascular: Normal rate, regular rhythm and normal heart sounds  Pulmonary/Chest: Effort normal and breath sounds normal  He has no wheezes  He has no rales  Lymphadenopathy:     He has no cervical adenopathy  Neurological: He is alert and oriented to person, place, and time  Skin: Skin is warm and dry     Psychiatric: He has a normal mood and affect  Nursing note and vitals reviewed

## 2018-11-25 NOTE — PATIENT INSTRUCTIONS
1  Pharyngitis  -Patient's daughter has strep therefore I am treating  -Take antibiotic as directed  -Use tylenol/motrin as directed  -Increase fluids  -Salt H20 gargles/throat lozenges  -Follow-up with PCP within 5-7 days    Go to ER with worsening symptoms, worsening pain, high fever, difficulty swallowing/drooling, or any signs of distress    Pharyngitis   AMBULATORY CARE:   Pharyngitis , or sore throat, is inflammation of the tissues and structures in your pharynx (throat)  Pharyngitis is most often caused by bacteria  It may also be caused by a cold or flu virus  Other causes include smoking, allergies, or acid reflux  Signs and symptoms that may occur with pharyngitis:   · Sore throat or pain when you swallow    · Fever, chills, and body aches    · Hoarse or raspy voice    · Cough, runny or stuffy nose, itchy or watery eyes    · Headache    · Upset stomach and loss of appetite    · Mild neck stiffness    · Swollen glands that feel like hard lumps when you touch your neck    · White and yellow pus-filled blisters in the back of your throat  Call 911 for any of the following:   · You have trouble breathing or swallowing because your throat is swollen or sore  Seek care immediately if:   · You are drooling because it hurts too much to swallow  · Your fever is higher than 102? F (39?C) or lasts longer than 3 days  · You are confused  · You taste blood in your throat  Contact your healthcare provider if:   · Your throat pain gets worse  · You have a painful lump in your throat that does not go away after 5 days  · Your symptoms do not improve after 5 days  · You have questions or concerns about your condition or care  Treatment for pharyngitis:  Viral pharyngitis will go away on its own without treatment  Your sore throat should start to feel better in 3 to 5 days for both viral and bacterial infections   You may need any of the following:  · Antibiotics  treat a bacterial infection  · NSAIDs , such as ibuprofen, help decrease swelling, pain, and fever  NSAIDs can cause stomach bleeding or kidney problems in certain people  If you take blood thinner medicine, always ask your healthcare provider if NSAIDs are safe for you  Always read the medicine label and follow directions  · Acetaminophen  decreases pain and fever  It is available without a doctor's order  Ask how much to take and how often to take it  Follow directions  Acetaminophen can cause liver damage if not taken correctly  Manage your symptoms:   · Gargle salt water  Mix ¼ teaspoon salt in an 8 ounce glass of warm water and gargle  This may help decrease swelling in your throat  · Drink liquids as directed  You may need to drink more liquids than usual  Liquids may help soothe your throat and prevent dehydration  Ask how much liquid to drink each day and which liquids are best for you  · Use a cool-steam humidifier  to help moisten the air in your room and calm your cough  · Soothe your throat  with cough drops, ice, soft foods, or popsicles  Prevent the spread of pharyngitis:  Cover your mouth and nose when you cough or sneeze  Do not share food or drinks  Wash your hands often  Use soap and water  If soap and water are unavailable, use an alcohol based hand   Follow up with your healthcare provider as directed:  Write down your questions so you remember to ask them during your visits  © 2017 [address] [employer] Information is for End User's use only and may not be sold, redistributed or otherwise used for commercial purposes  All illustrations and images included in CareNotes® are the copyrighted property of A D [employer] M [employer] Inc  or [employer]  The above information is an  only  It is not intended as medical advice for individual conditions or treatments   Talk to your doctor, nurse or pharmacist before following any medical regimen to see if it is safe and effective for you

## 2018-12-28 ENCOUNTER — OFFICE VISIT (OUTPATIENT)
Dept: URGENT CARE | Facility: CLINIC | Age: 33
End: 2018-12-28
Payer: COMMERCIAL

## 2018-12-28 VITALS
DIASTOLIC BLOOD PRESSURE: 80 MMHG | BODY MASS INDEX: 32.93 KG/M2 | SYSTOLIC BLOOD PRESSURE: 123 MMHG | OXYGEN SATURATION: 98 % | HEART RATE: 82 BPM | RESPIRATION RATE: 18 BRPM | TEMPERATURE: 98.7 F | HEIGHT: 70 IN | WEIGHT: 230 LBS

## 2018-12-28 DIAGNOSIS — M54.50 ACUTE BILATERAL LOW BACK PAIN WITHOUT SCIATICA: Primary | ICD-10-CM

## 2018-12-28 PROCEDURE — G0382 LEV 3 HOSP TYPE B ED VISIT: HCPCS | Performed by: PHYSICIAN ASSISTANT

## 2018-12-28 PROCEDURE — 99283 EMERGENCY DEPT VISIT LOW MDM: CPT | Performed by: PHYSICIAN ASSISTANT

## 2018-12-28 RX ORDER — CYCLOBENZAPRINE HCL 10 MG
10 TABLET ORAL 3 TIMES DAILY PRN
Qty: 20 TABLET | Refills: 0 | Status: SHIPPED | OUTPATIENT
Start: 2018-12-28

## 2018-12-28 NOTE — PROGRESS NOTES
[employer]        NAME: Wade Bearden is a 28 y o  male  : 1985    MRN: [id number]  DATE: 2018  TIME: 2:05 PM    Assessment and Plan   Acute bilateral low back pain without sciatica [M54 5]  1  Acute bilateral low back pain without sciatica  cyclobenzaprine (FLEXERIL) 10 mg tablet         Patient Instructions     1  Low back pain  -Apply warm, moist heat  -Gentle stretching  -Use Ibuprofen every 6-8 hours with food  -Use muscle relaxer as directed- do not drive or work while taking this  -Follow-up with PCP within 1 week    Go to ER with worsening symptoms, worsening pain, fever, numbness/tingling, bowel/bladder incontinence, weakness, or any new concerns    Chief Complaint     Chief Complaint   Patient presents with    Back Pain     onset AM today  Pt states he woke up w/ lower back pain  Diffucult to move and breathe  Took iibuprophen w/ no relief  History of Present Illness       Patient is a 67-year-old male who presents today for evaluation of low back pain that started this morning upon waking  Patient states that the pain is localized to his bilateral lower back and states that it becomes worse with movements and with breathing  He rates his pain as a 6/10  Patient states that he tried taking Motrin prior to arrival without any relief  Review of Systems   Review of Systems   Constitutional: Negative for chills and fever  Respiratory: Negative for shortness of breath  Cardiovascular: Negative for chest pain  Genitourinary: Negative for difficulty urinating  Musculoskeletal: Positive for back pain  Neurological: Negative for weakness and numbness           Current Medications       Current Outpatient Prescriptions:     cholestyramine (QUESTRAN) 4 GM/DOSE powder, Take by mouth, Disp: , Rfl:     amphetamine-dextroamphetamine (ADDERALL XR, 30MG,) 30 MG 24 hr capsule, Take 30 mg by mouth every morning, Disp: , Rfl:     buPROPion (WELLBUTRIN SR) 100 mg 12 hr tablet, Take by mouth, Disp: , Rfl:     cyclobenzaprine (FLEXERIL) 10 mg tablet, Take 1 tablet (10 mg total) by mouth 3 (three) times a day as needed for muscle spasms, Disp: 20 tablet, Rfl: 0    Current Allergies     Allergies as of 12/28/2018    (No Known Allergies)            The following portions of the patient's history were reviewed and updated as appropriate: allergies, current medications, past family history, past medical history, past social history, past surgical history and problem list      Past Medical History:   Diagnosis Date    GERD (gastroesophageal reflux disease)        Past Surgical History:   Procedure Laterality Date    CHOLECYSTECTOMY      [place] ESOPHAGOGASTRODUODENOSCOPY TRANSORAL DIAGNOSTIC N/A 4/18/2018    Procedure: ESOPHAGOGASTRODUODENOSCOPY (EGD); Surgeon: Odalis Iqbal MD;  Location: [facility]; Service: Gastroenterology       Family History   Problem Relation Age of Onset    No Known Problems Mother     No Known Problems Father          Medications have been verified  Objective   /80   Pulse 82   Temp 98 7 °F (37 1 °C) (Temporal)   Resp 18   Ht 5' 10" (1 778 m)   Wt 104 kg (230 lb)   SpO2 98%   BMI 33 00 kg/m²        Physical Exam     Physical Exam   Constitutional: He is oriented to person, place, and time  He appears well-developed and well-nourished  No distress  Cardiovascular: Normal rate, regular rhythm and normal heart sounds  Pulmonary/Chest: Effort normal and breath sounds normal  He has no wheezes  He has no rales  Musculoskeletal:        Back:    Neurological: He is alert and oriented to person, place, and time  He has normal strength  No sensory deficit  Reflex Scores:       Patellar reflexes are 2+ on the right side and 2+ on the left side  Achilles reflexes are 2+ on the right side and 2+ on the left side  Skin: Skin is warm and dry  Psychiatric: He has a normal mood and affect     Nursing note and vitals reviewed

## 2018-12-28 NOTE — PATIENT INSTRUCTIONS
1  Low back pain  -Apply warm, moist heat  -Gentle stretching  -Use Ibuprofen every 6-8 hours with food  -Use muscle relaxer as directed- do not drive or work while taking this  -Follow-up with PCP within 1 week    Go to ER with worsening symptoms, worsening pain, fever, numbness/tingling, bowel/bladder incontinence, weakness, or any new concerns    Acute Low Back Pain   AMBULATORY CARE:   Acute low back pain  is sudden discomfort in your lower back area that lasts for up to 6 weeks  The discomfort makes it difficult to tolerate activity  Common symptoms include the following:   · Back stiffness or spasms    · Pain down the back or side of one leg    · Holding yourself in an unusual position or posture to decrease your back pain    · Not being able to find a sitting position that is comfortable    · Slow increase in your pain for 24 to 48 hours after you stress your back    · Tenderness on your lower back or severe pain when you move your back  Seek immediate care for the following symptoms:   · Severe pain    · Sudden stiffness and heaviness in both buttocks down to both legs    · Numbness or weakness in one leg, or pain in both legs    · Numbness in your genital area or across your lower back    · Unable to control your urine or bowel movements  Contact your healthcare provider if:   · You have a fever  · You have pain at night or when you rest     · Your pain does not get better with treatment  · You have pain that worsens when you cough or sneeze  · You suddenly feel something pop or snap in your back  · You have questions or concerns about your condition or care  The goal of treatment for acute low back pain  is to relieve your pain and help you tolerate activity  Most people with acute lower back pain get better within 4 to 6 weeks  You may need any of the following:  · Acetaminophen  decreases pain  It is available without a doctor's order  Ask how much to take and how often to take it  Follow directions  Acetaminophen can cause liver damage if not taken correctly  · NSAIDs  help decrease swelling and pain  This medicine is available with or without a doctor's order  NSAIDs can cause stomach bleeding or kidney problems in certain people  If you take blood thinner medicine, always ask your healthcare provider if NSAIDs are safe for you  Always read the medicine label and follow directions  · Prescription pain medicine  may be given  Ask your healthcare provider how to take this medicine safely  · Muscle relaxers  decrease pain by relaxing the muscles in your lower spine  Manage your symptoms:   · Stay active  as much as you can without causing more pain  Bed rest could make your back pain worse  Start with some light exercises such as walking  Avoid heavy lifting until your pain is gone  Ask for more information about the activities or exercises that are right for you  · Ice  helps decrease swelling, pain, and muscle spams  Put crushed ice in a plastic bag  Cover it with a towel  Place it on your lower back for 20 to 30 minutes every 2 hours  Do this for about 2 to 3 days after your pain starts, or as directed  · Heat  helps decrease pain and muscle spasms  Start to use heat after treatment with ice has stopped  Use a small towel dampened with warm water or a heating pad, or sit in a warm bath  Apply heat on the area for 20 to 30 minutes every 2 hours for as many days as directed  Alternate heat and ice  Prevent acute low back pain:   · Use proper body mechanics  ¨ Bend at the hips and knees when you  objects  Do not bend from the waist  Use your leg muscles as you lift the load  Do not use your back  Keep the object close to your chest as you lift it  Try not to twist or lift anything above your waist     ¨ Change your position often when you stand for long periods of time  Rest one foot on a small box or footrest, and then switch to the other foot often      ¨ Try not to sit for long periods of time  When you do, sit in a straight-backed chair with your feet flat on the floor  Never reach, pull, or push while you are sitting  · Do exercises that strengthen your back muscles  Warm up before you exercise  Ask your healthcare provider the best exercises for you  · Maintain a healthy weight  Ask your healthcare provider how much you should weigh  Ask him to help you create a weight loss plan if you are overweight  Follow up with your healthcare provider as directed:  Return for a follow-up visit if you still have pain after 1 to 3 weeks of treatment  You may need to visit an orthopedist if your back pain lasts more than 12 weeks  Write down your questions so you remember to ask them during your visits  © 2017 [employer] Information is for End User's use only and may not be sold, redistributed or otherwise used for commercial purposes  All illustrations and images included in CareNotes® are the copyrighted property of A D A M , Inc  or [employer]  The above information is an  only  It is not intended as medical advice for individual conditions or treatments  Talk to your doctor, nurse or pharmacist before following any medical regimen to see if it is safe and effective for you

## 2019-04-03 DIAGNOSIS — T75.3XXA MOTION SICKNESS, INITIAL ENCOUNTER: Primary | ICD-10-CM

## 2019-04-03 RX ORDER — SCOLOPAMINE TRANSDERMAL SYSTEM 1 MG/1
1 PATCH, EXTENDED RELEASE TRANSDERMAL
Qty: 3 PATCH | Refills: 0 | Status: SHIPPED | OUTPATIENT
Start: 2019-04-03 | End: 2019-04-12

## 2021-10-13 ENCOUNTER — TELEPHONE (OUTPATIENT)
Dept: FAMILY MEDICINE CLINIC | Facility: CLINIC | Age: 36
End: 2021-10-13

## 2021-10-13 PROCEDURE — U0005 INFEC AGEN DETEC AMPLI PROBE: HCPCS | Performed by: PHYSICIAN ASSISTANT

## 2021-10-13 PROCEDURE — U0003 INFECTIOUS AGENT DETECTION BY NUCLEIC ACID (DNA OR RNA); SEVERE ACUTE RESPIRATORY SYNDROME CORONAVIRUS 2 (SARS-COV-2) (CORONAVIRUS DISEASE [COVID-19]), AMPLIFIED PROBE TECHNIQUE, MAKING USE OF HIGH THROUGHPUT TECHNOLOGIES AS DESCRIBED BY CMS-2020-01-R: HCPCS | Performed by: PHYSICIAN ASSISTANT

## 2021-10-15 ENCOUNTER — TELEMEDICINE (OUTPATIENT)
Dept: FAMILY MEDICINE CLINIC | Facility: CLINIC | Age: 36
End: 2021-10-15
Payer: COMMERCIAL

## 2021-10-15 DIAGNOSIS — U07.1 COVID-19: Primary | ICD-10-CM

## 2021-10-15 PROCEDURE — 99213 OFFICE O/P EST LOW 20 MIN: CPT | Performed by: PHYSICIAN ASSISTANT

## 2021-10-19 ENCOUNTER — TELEMEDICINE (OUTPATIENT)
Dept: FAMILY MEDICINE CLINIC | Facility: CLINIC | Age: 36
End: 2021-10-19
Payer: COMMERCIAL

## 2021-10-19 DIAGNOSIS — U07.1 COVID-19: Primary | ICD-10-CM

## 2021-10-19 PROCEDURE — 99213 OFFICE O/P EST LOW 20 MIN: CPT | Performed by: PHYSICIAN ASSISTANT

## 2021-10-26 ENCOUNTER — TELEPHONE (OUTPATIENT)
Dept: FAMILY MEDICINE CLINIC | Facility: CLINIC | Age: 36
End: 2021-10-26

## 2021-12-08 ENCOUNTER — TELEMEDICINE (OUTPATIENT)
Dept: FAMILY MEDICINE CLINIC | Facility: CLINIC | Age: 36
End: 2021-12-08
Payer: COMMERCIAL

## 2021-12-08 DIAGNOSIS — K08.89 PAIN, DENTAL: Primary | ICD-10-CM

## 2021-12-08 PROCEDURE — 99213 OFFICE O/P EST LOW 20 MIN: CPT | Performed by: PHYSICIAN ASSISTANT

## 2021-12-08 PROCEDURE — 3725F SCREEN DEPRESSION PERFORMED: CPT | Performed by: PHYSICIAN ASSISTANT

## 2021-12-08 RX ORDER — AMOXICILLIN AND CLAVULANATE POTASSIUM 875; 125 MG/1; MG/1
1 TABLET, FILM COATED ORAL EVERY 12 HOURS SCHEDULED
Qty: 20 TABLET | Refills: 0 | Status: SHIPPED | OUTPATIENT
Start: 2021-12-08 | End: 2021-12-18

## 2021-12-08 RX ORDER — IBUPROFEN 800 MG/1
800 TABLET ORAL EVERY 8 HOURS PRN
Qty: 30 TABLET | Refills: 1 | Status: SHIPPED | OUTPATIENT
Start: 2021-12-08

## 2022-08-23 ENCOUNTER — HOSPITAL ENCOUNTER (OUTPATIENT)
Dept: CT IMAGING | Facility: HOSPITAL | Age: 37
Discharge: HOME | End: 2022-08-23
Payer: COMMERCIAL

## 2022-08-23 ENCOUNTER — OFFICE VISIT (OUTPATIENT)
Dept: FAMILY MEDICINE CLINIC | Facility: CLINIC | Age: 37
End: 2022-08-23
Payer: COMMERCIAL

## 2022-08-23 ENCOUNTER — TELEPHONE (OUTPATIENT)
Dept: FAMILY MEDICINE CLINIC | Facility: CLINIC | Age: 37
End: 2022-08-23

## 2022-08-23 VITALS
WEIGHT: 264 LBS | DIASTOLIC BLOOD PRESSURE: 98 MMHG | HEART RATE: 80 BPM | BODY MASS INDEX: 37.8 KG/M2 | SYSTOLIC BLOOD PRESSURE: 132 MMHG | TEMPERATURE: 98 F | OXYGEN SATURATION: 98 % | HEIGHT: 70 IN

## 2022-08-23 DIAGNOSIS — R10.9 RIGHT FLANK PAIN: ICD-10-CM

## 2022-08-23 DIAGNOSIS — L05.91 PILONIDAL CYST: ICD-10-CM

## 2022-08-23 DIAGNOSIS — R10.9 RIGHT FLANK PAIN: Primary | ICD-10-CM

## 2022-08-23 DIAGNOSIS — Z13.220 SCREENING, LIPID: ICD-10-CM

## 2022-08-23 DIAGNOSIS — Z13.1 SCREENING FOR DIABETES MELLITUS (DM): ICD-10-CM

## 2022-08-23 LAB
SL AMB  POCT GLUCOSE, UA: NEGATIVE
SL AMB LEUKOCYTE ESTERASE,UA: NEGATIVE
SL AMB POCT BILIRUBIN,UA: NEGATIVE
SL AMB POCT BLOOD,UA: ABNORMAL
SL AMB POCT KETONES,UA: ABNORMAL
SL AMB POCT NITRITE,UA: NEGATIVE
SL AMB POCT PH,UA: 5.5
SL AMB POCT SPECIFIC GRAVITY,UA: >=1.03
SL AMB POCT URINE PROTEIN: NEGATIVE
SL AMB POCT UROBILINOGEN: ABNORMAL

## 2022-08-23 PROCEDURE — G1004 CDSM NDSC: HCPCS

## 2022-08-23 PROCEDURE — 81002 URINALYSIS NONAUTO W/O SCOPE: CPT | Performed by: PHYSICIAN ASSISTANT

## 2022-08-23 PROCEDURE — 74176 CT ABD & PELVIS W/O CONTRAST: CPT

## 2022-08-23 PROCEDURE — 87086 URINE CULTURE/COLONY COUNT: CPT | Performed by: PHYSICIAN ASSISTANT

## 2022-08-23 PROCEDURE — 99214 OFFICE O/P EST MOD 30 MIN: CPT | Performed by: PHYSICIAN ASSISTANT

## 2022-08-23 RX ORDER — SULFAMETHOXAZOLE AND TRIMETHOPRIM 800; 160 MG/1; MG/1
1 TABLET ORAL EVERY 12 HOURS SCHEDULED
Qty: 14 TABLET | Refills: 0 | Status: SHIPPED | OUTPATIENT
Start: 2022-08-23 | End: 2022-08-30

## 2022-08-23 NOTE — PROGRESS NOTES
Assessment/Plan:    No problem-specific Assessment & Plan notes found for this encounter  Diagnoses and all orders for this visit:    Right flank pain  -     Cancel: CBC and differential; Future  -     Cancel: Comprehensive metabolic panel; Future  -     CT abdomen pelvis wo contrast; Future  -     CBC and differential; Future  -     Comprehensive metabolic panel; Future  -     POCT urine dip  -     Cancel: Urine culture; Future  -     Urine culture; Future  -     Urine culture  -     sulfamethoxazole-trimethoprim (BACTRIM DS) 800-160 mg per tablet; Take 1 tablet by mouth every 12 (twelve) hours for 7 days    Pilonidal cyst  -     sulfamethoxazole-trimethoprim (BACTRIM DS) 800-160 mg per tablet; Take 1 tablet by mouth every 12 (twelve) hours for 7 days        STAT CT 8/23/22  No acute intraabdominal/pelvic abnormalities noted on this limited noncontrast exam  Discussed results with patient personally  If any worsening back pain or abdominal pain, fever, SOB, CP he is to proceed directly to the ER        CMP and CBC within normal limits    Subjective:      Patient ID: Diane Barbour is a 44 y o  male  Patient presents today for evaluation of flank pain  Has been experiencing R flank pain x 2 weeks   He rates the pain today 6/10  Describes the pain as sharp and intermittent  Does not radiate  It is worse with position changes, breathing at times if he is bent over  Tolerating PO     No injuries  Denies frequency, hematuria    Denies SOB or cough  Denies fever or chills            The following portions of the patient's history were reviewed and updated as appropriate: allergies, current medications, past family history, past social history, past surgical history and problem list     Review of Systems   Constitutional: Negative for chills, fatigue and fever  HENT: Negative for congestion, ear pain, sinus pain, sore throat and trouble swallowing  Eyes: Negative for pain, discharge and redness  Respiratory: Negative for cough, chest tightness, shortness of breath and wheezing  Cardiovascular: Negative for chest pain, palpitations and leg swelling  Gastrointestinal: Negative for abdominal pain, diarrhea, nausea and vomiting  Musculoskeletal: Positive for back pain  Negative for arthralgias, joint swelling and myalgias  Skin: Negative for rash  Neurological: Negative for dizziness, weakness, numbness and headaches  Objective:      /98 (BP Location: Left arm, Patient Position: Sitting, Cuff Size: Large)   Pulse 80   Temp 98 °F (36 7 °C)   Ht 5' 10" (1 778 m)   Wt 120 kg (264 lb)   SpO2 98%   BMI 37 88 kg/m²          Physical Exam  Vitals and nursing note reviewed  Constitutional:       General: He is not in acute distress  Appearance: Normal appearance  He is well-developed  Cardiovascular:      Rate and Rhythm: Normal rate and regular rhythm  Pulmonary:      Effort: Pulmonary effort is normal       Breath sounds: Normal breath sounds  Abdominal:      General: Bowel sounds are normal       Palpations: Abdomen is soft  Abdomen is not rigid  Tenderness: There is no abdominal tenderness  There is right CVA tenderness  There is no left CVA tenderness, guarding or rebound  Negative signs include Centeno's sign and McBurney's sign  Hernia: No hernia is present  Musculoskeletal:      Cervical back: Normal       Thoracic back: Normal       Lumbar back: Normal    Skin:     General: Skin is warm and dry

## 2022-08-24 ENCOUNTER — HOSPITAL ENCOUNTER (EMERGENCY)
Facility: HOSPITAL | Age: 37
Discharge: HOME | End: 2022-08-25
Attending: EMERGENCY MEDICINE
Payer: COMMERCIAL

## 2022-08-24 DIAGNOSIS — K29.70 GASTRITIS: ICD-10-CM

## 2022-08-24 DIAGNOSIS — M62.838 MUSCLE SPASM: Primary | ICD-10-CM

## 2022-08-24 PROCEDURE — 99284 EMERGENCY DEPT VISIT MOD MDM: CPT | Performed by: EMERGENCY MEDICINE

## 2022-08-24 PROCEDURE — 99284 EMERGENCY DEPT VISIT MOD MDM: CPT

## 2022-08-24 RX ORDER — DIAZEPAM 5 MG/1
5 TABLET ORAL ONCE
Status: COMPLETED | OUTPATIENT
Start: 2022-08-24 | End: 2022-08-24

## 2022-08-24 RX ORDER — ACETAMINOPHEN 325 MG/1
975 TABLET ORAL ONCE
Status: COMPLETED | OUTPATIENT
Start: 2022-08-24 | End: 2022-08-24

## 2022-08-24 RX ORDER — LIDOCAINE 50 MG/G
1 PATCH TOPICAL ONCE
Status: DISCONTINUED | OUTPATIENT
Start: 2022-08-24 | End: 2022-08-25 | Stop reason: HOSPADM

## 2022-08-24 RX ADMIN — ACETAMINOPHEN 975 MG: 325 TABLET, FILM COATED ORAL at 23:35

## 2022-08-24 RX ADMIN — LIDOCAINE 5% 1 PATCH: 700 PATCH TOPICAL at 23:35

## 2022-08-24 RX ADMIN — DIAZEPAM 5 MG: 5 TABLET ORAL at 23:34

## 2022-08-25 ENCOUNTER — APPOINTMENT (EMERGENCY)
Dept: CT IMAGING | Facility: HOSPITAL | Age: 37
End: 2022-08-25
Payer: COMMERCIAL

## 2022-08-25 ENCOUNTER — TELEPHONE (OUTPATIENT)
Dept: FAMILY MEDICINE CLINIC | Facility: CLINIC | Age: 37
End: 2022-08-25

## 2022-08-25 VITALS
BODY MASS INDEX: 37.94 KG/M2 | HEART RATE: 73 BPM | HEIGHT: 70 IN | TEMPERATURE: 98.5 F | DIASTOLIC BLOOD PRESSURE: 86 MMHG | OXYGEN SATURATION: 98 % | SYSTOLIC BLOOD PRESSURE: 147 MMHG | RESPIRATION RATE: 18 BRPM | WEIGHT: 265 LBS

## 2022-08-25 DIAGNOSIS — R10.9 RIGHT FLANK PAIN: Primary | ICD-10-CM

## 2022-08-25 PROCEDURE — 96374 THER/PROPH/DIAG INJ IV PUSH: CPT

## 2022-08-25 PROCEDURE — 71275 CT ANGIOGRAPHY CHEST: CPT

## 2022-08-25 PROCEDURE — C9113 INJ PANTOPRAZOLE SODIUM, VIA: HCPCS | Performed by: EMERGENCY MEDICINE

## 2022-08-25 PROCEDURE — 96375 TX/PRO/DX INJ NEW DRUG ADDON: CPT

## 2022-08-25 PROCEDURE — 74174 CTA ABD&PLVS W/CONTRAST: CPT

## 2022-08-25 RX ORDER — DIAZEPAM 5 MG/1
5 TABLET ORAL 2 TIMES DAILY
Qty: 10 TABLET | Refills: 0 | Status: SHIPPED | OUTPATIENT
Start: 2022-08-25 | End: 2022-08-25

## 2022-08-25 RX ORDER — FAMOTIDINE 20 MG/1
20 TABLET, FILM COATED ORAL 2 TIMES DAILY
Qty: 30 TABLET | Refills: 0 | Status: SHIPPED | OUTPATIENT
Start: 2022-08-25

## 2022-08-25 RX ORDER — TRAMADOL HYDROCHLORIDE 50 MG/1
50 TABLET ORAL EVERY 6 HOURS PRN
Qty: 30 TABLET | Refills: 0 | Status: SHIPPED | OUTPATIENT
Start: 2022-08-25

## 2022-08-25 RX ORDER — CYCLOBENZAPRINE HCL 10 MG
10 TABLET ORAL 3 TIMES DAILY PRN
Qty: 42 TABLET | Refills: 0 | Status: SHIPPED | OUTPATIENT
Start: 2022-08-25

## 2022-08-25 RX ORDER — FAMOTIDINE 10 MG/ML
20 INJECTION, SOLUTION INTRAVENOUS ONCE
Status: COMPLETED | OUTPATIENT
Start: 2022-08-25 | End: 2022-08-25

## 2022-08-25 RX ORDER — PANTOPRAZOLE SODIUM 40 MG/10ML
40 INJECTION, POWDER, LYOPHILIZED, FOR SOLUTION INTRAVENOUS ONCE
Status: COMPLETED | OUTPATIENT
Start: 2022-08-25 | End: 2022-08-25

## 2022-08-25 RX ORDER — LIDOCAINE 50 MG/G
1 PATCH TOPICAL DAILY
Qty: 30 PATCH | Refills: 0 | Status: SHIPPED | OUTPATIENT
Start: 2022-08-25

## 2022-08-25 RX ADMIN — FAMOTIDINE 20 MG: 10 INJECTION, SOLUTION INTRAVENOUS at 02:10

## 2022-08-25 RX ADMIN — PANTOPRAZOLE SODIUM 40 MG: 40 INJECTION, POWDER, FOR SOLUTION INTRAVENOUS at 02:10

## 2022-08-25 RX ADMIN — IOHEXOL 100 ML: 350 INJECTION, SOLUTION INTRAVENOUS at 00:36

## 2022-08-25 NOTE — TELEPHONE ENCOUNTER
Pt seen in ER yesterday for worsening pain  will send in script for muscle relaxer and tramadol to pharmacy listed

## 2022-08-25 NOTE — ED PROVIDER NOTES
History  Chief Complaint   Patient presents with    Flank Pain     Right sided flank pain radiating to right low back and RUQ abdomen  Outpatient CT performed yesterday with no acute findings  Patient reports increased pain today  Denies any difficulty urinating     Go patient is a 78-year-old male past medical history GERD presenting with right flank pain  Patient notes that for the last 2 weeks he has had right-sided flank pain which has been intermittent but worsened today this afternoon after [place] around [place]  He states that it radiates into his abdomen into the other side of his back and is worse with movement  Not relieved by 800 mg ibuprofen which he took prior to arrival   He states that he saw his PCP for it and had CT which was normal as well as labs  Was told to come to the ER if his pain worsen  Denies any falls, heavy lifting, new injuries  Denies any dysuria, hematuria, cough, fevers, chest pain but does note that the pain is worse with deep breathing  Denies any nausea/vomiting/diarrhea/constipation, bowel or bladder incontinence, saddle anesthesia, dysuria, urinary retention, leg swelling  Prior to Admission Medications   Prescriptions Last Dose Informant Patient Reported? Taking?   sulfamethoxazole-trimethoprim (BACTRIM DS) 800-160 mg per tablet   No No   Sig: Take 1 tablet by mouth every 12 (twelve) hours for 7 days      Facility-Administered Medications: None       Past Medical History:   Diagnosis Date    GERD (gastroesophageal reflux disease)        Past Surgical History:   Procedure Laterality Date    CHOLECYSTECTOMY      [place] ESOPHAGOGASTRODUODENOSCOPY TRANSORAL DIAGNOSTIC N/A 4/18/2018    Procedure: ESOPHAGOGASTRODUODENOSCOPY (EGD); Surgeon: Tiffanie Larson MD;  Location: [facility];   Service: Gastroenterology       Family History   Problem Relation Age of Onset    No Known Problems Mother     No Known Problems Father      I have reviewed and agree with the history as documented  E-Cigarette/Vaping     E-Cigarette/Vaping Substances     Social History     Tobacco Use    Smoking status: Never Smoker    Smokeless tobacco: Never Used   Substance Use Topics    Alcohol use: Yes     Comment: occasional    Drug use: Yes     Types: Marijuana     Comment: once monthly       Review of Systems   All other systems reviewed and are negative  Physical Exam  Physical Exam  Vitals reviewed  Constitutional:       General: He is not in acute distress  Appearance: Normal appearance  He is not ill-appearing  HENT:      Mouth/Throat:      Mouth: Mucous membranes are moist    Eyes:      Conjunctiva/sclera: Conjunctivae normal    Cardiovascular:      Rate and Rhythm: Normal rate and regular rhythm  Heart sounds: Normal heart sounds  Pulmonary:      Effort: Pulmonary effort is normal       Breath sounds: Normal breath sounds  Abdominal:      General: Abdomen is flat  Palpations: Abdomen is soft  Tenderness: There is abdominal tenderness  There is right CVA tenderness  There is no guarding  Comments: Left upper quadrant abdominal pain without guarding   Musculoskeletal:         General: No swelling  Normal range of motion  Cervical back: Neck supple  Comments: No spinal tenderness   Skin:     General: Skin is warm and dry  Neurological:      General: No focal deficit present  Mental Status: He is alert  Motor: No weakness        Gait: Gait abnormal       Comments: Patient ambulatory but hunched forward   Psychiatric:         Mood and Affect: Mood normal          Vital Signs  ED Triage Vitals [08/24/22 2227]   Temperature Pulse Respirations Blood Pressure SpO2   98 5 °F (36 9 °C) 90 18 154/99 98 %      Temp Source Heart Rate Source Patient Position - Orthostatic VS BP Location FiO2 (%)   Oral Monitor Sitting Left arm --      Pain Score       --           Vitals:    08/24/22 2227 08/25/22 0108   BP: 154/99 147/86   Pulse: 90 73   Patient Position - Orthostatic VS: Sitting Sitting         Visual Acuity      ED Medications  Medications   lidocaine (LIDODERM) 5 % patch 1 patch (1 patch Topical Medication Applied 8/24/22 2335)   Famotidine (PF) (PEPCID) injection 20 mg (has no administration in time range)   pantoprazole (PROTONIX) injection 40 mg (has no administration in time range)   acetaminophen (TYLENOL) tablet 975 mg (975 mg Oral Given 8/24/22 2335)   diazepam (VALIUM) tablet 5 mg (5 mg Oral Given 8/24/22 2334)   iohexol (OMNIPAQUE) 350 MG/ML injection (MULTI-DOSE) 100 mL (100 mL Intravenous Given 8/25/22 0036)       Diagnostic Studies  Results Reviewed     None                 CTA dissection protocol chest/abdomen/pelvis   Final Result by Hilarie Duverney, DO (08/25 3836)      No evidence of aortic dissection or aneurysm  Thickening of the gastric wall which may represent gastritis  Follow-up with gastroenterology is recommended      The study was marked in [facility] for immediate notification  Workstation performed: [id number]                    Procedures  Procedures         ED Course  ED Course as of 08/25/22 0201   u Aug 25, 2022   0201 Findings consistent with gastritis, will give famotidine and pantoprazole and discharge with GI follow-up and famotidine  SBIRT 20yo+    Flowsheet Row Most Recent Value   SBIRT (25 yo +)    In order to provide better care to our patients, we are screening all of our patients for alcohol and drug use  Would it be okay to ask you these screening questions? Yes Filed at: 08/24/2022 2356   Initial Alcohol Screen: US AUDIT-C     1  How often do you have a drink containing alcohol? 1 Filed at: 08/24/2022 2356   2  How many drinks containing alcohol do you have on a typical day you are drinking? 1 Filed at: 08/24/2022 2356   3a  Male UNDER 65: How often do you have five or more drinks on one occasion?  0 Filed at: 08/24/2022 2356   Audit-C Score 2 Filed at: 08/24/2022 2356   ADIS: How many times in the past year have you    Used an illegal drug or used a prescription medication for non-medical reasons? Never Filed at: 08/24/2022 2356                    [facility]  Number of Diagnoses or Management Options  Diagnosis management comments: Patient is a 69-year-old male past medical history of GERD presenting with flank pain  Patient is well-appearing bedside with stable vitals and in no acute distress  He is ambulatory at bedside however ambulates only hunched forward secondary to back pain but has no spinal tenderness, right CVA tenderness, left upper quadrant abdominal tenderness without guarding and no other significant physical exam findings  Patient had labs, urinalysis, CT abdomen pelvis without contrast done today which were unremarkable  As patient notes worsened pain specifically to his flank radiating into his abdomen will obtain CT dissection to rule out vascular causes, give pain control and reassess  Disposition  Final diagnoses:   Muscle spasm   Gastritis     Time reflects when diagnosis was documented in both MDM as applicable and the Disposition within this note     Time User Action Codes Description Comment    8/25/2022  1:46 AM Dorthea Cerise Add [X50 608] Muscle spasm     8/25/2022  1:59 AM Dorthea Cerise Add [K29 70] Gastritis       ED Disposition     ED Disposition   Discharge    Condition   Stable    Date/Time   Thu Aug 25, 2022  1:59 AM    Comment   Alina Ort discharge to home/self care                 Follow-up Information     Follow up With Specialties Details Why Contact Info Additional Information    [facility] Emergency Department Emergency Medicine  If symptoms worsen [address] [facility] [id number] [facility] Emergency Department, [address] [place], [place], [address] [place] [address] Family Medicine In 1 week  [address] [facility] Schedule an appointment as soon as possible for a visit   [address] [address]  Marcell Jimenez [address] [facility] [place], [address], [address], [place], [place], [address] [phone]           Patient's Medications   Discharge Prescriptions    FAMOTIDINE (PEPCID) 20 MG TABLET    Take 1 tablet (20 mg total) by mouth 2 (two) times a day       Start Date: 8/25/2022 End Date: --       Order Dose: 20 mg       Quantity: 30 tablet    Refills: 0    LIDOCAINE (LIDODERM) 5 %    Apply 1 patch topically daily Remove & Discard patch within 12 hours or as directed by MD       Start Date: 8/25/2022 End Date: --       Order Dose: 1 patch       Quantity: 30 patch    Refills: 0       No discharge procedures on file      PDMP Review     None          ED Provider  Electronically Signed by           Tonny Stevens DO  08/25/22 6666

## 2022-08-26 LAB — BACTERIA UR CULT: NORMAL

## 2022-08-31 DIAGNOSIS — K21.9 GASTROESOPHAGEAL REFLUX DISEASE WITHOUT ESOPHAGITIS: Primary | ICD-10-CM

## 2022-08-31 RX ORDER — PANTOPRAZOLE SODIUM 40 MG/1
40 TABLET, DELAYED RELEASE ORAL
Qty: 90 TABLET | Refills: 1 | Status: SHIPPED | OUTPATIENT
Start: 2022-08-31 | End: 2022-11-07 | Stop reason: ALTCHOICE

## 2022-11-03 ENCOUNTER — OFFICE VISIT (OUTPATIENT)
Dept: URGENT CARE | Facility: CLINIC | Age: 37
End: 2022-11-03

## 2022-11-03 VITALS
SYSTOLIC BLOOD PRESSURE: 144 MMHG | TEMPERATURE: 98.6 F | BODY MASS INDEX: 37.77 KG/M2 | RESPIRATION RATE: 16 BRPM | WEIGHT: 263.25 LBS | OXYGEN SATURATION: 97 % | DIASTOLIC BLOOD PRESSURE: 78 MMHG | HEART RATE: 85 BPM

## 2022-11-03 DIAGNOSIS — L03.316 CELLULITIS OF UMBILICUS: Primary | ICD-10-CM

## 2022-11-03 DIAGNOSIS — L05.91 INFECTED PILONIDAL CYST: ICD-10-CM

## 2022-11-03 RX ORDER — CEPHALEXIN 500 MG/1
500 CAPSULE ORAL EVERY 12 HOURS SCHEDULED
Qty: 14 CAPSULE | Refills: 0 | Status: SHIPPED | OUTPATIENT
Start: 2022-11-03 | End: 2022-11-10

## 2022-11-03 NOTE — PROGRESS NOTES
[facility]        NAME: Kristie Partida is a 44 y o  male  : 1985    MRN: [id number]  DATE: November 3, 2022  TIME: 11:39 AM    Assessment and Plan   Cellulitis of umbilicus [M43 224]  1  Cellulitis of umbilicus  cephalexin (KEFLEX) 500 mg capsule   2  Infected pilonidal cyst  cephalexin (KEFLEX) 500 mg capsule    Ambulatory Referral to General Surgery       - Will treat with Keflex for umbilical infection and pilonidal cyst infection  - Discussed with patient that he should follow up with PCP and/or Gen Surg     Patient Instructions       Follow up with PCP in 3-5 days  Proceed to  ER if symptoms worsen  Chief Complaint     Chief Complaint   Patient presents with   • Cystitis     Pylonidal cyst burst recently, keeping area clean  Currently draining  • Wound Check     Draining from belly button, pus and blood 1 week ago  No fever  History of Present Illness       Patient is a 43 yo male who presents for evaluation of draining pilonidal cyst x a few weeks as well drainage from his belly button x 1 week  He reports having a pilonidal cyst for years which burst a couple of weeks ago  The area has been draining since and he has been keeping it clean and covered  He also has been having blood and pus draining from his belly button over the last week  Denies trauma to the belly button  No fevers, no abdominal or umbilical pain  Review of Systems   Review of Systems   Constitutional: Negative for chills, fatigue and fever  Skin: Positive for wound          Belly button and pilonidal cyst draining          Current Medications       Current Outpatient Medications:   •  cephalexin (KEFLEX) 500 mg capsule, Take 1 capsule (500 mg total) by mouth every 12 (twelve) hours for 7 days, Disp: 14 capsule, Rfl: 0  •  esomeprazole (NexIUM) 20 mg capsule, Take 20 mg by mouth every morning before breakfast, Disp: , Rfl:   •  pantoprazole (PROTONIX) 40 mg tablet, Take 1 tablet (40 mg total) by mouth daily before breakfast (Patient not taking: Reported on 11/3/2022), Disp: 90 tablet, Rfl: 1  •  cyclobenzaprine (FLEXERIL) 10 mg tablet, Take 1 tablet (10 mg total) by mouth 3 (three) times a day as needed for muscle spasms (Patient not taking: Reported on 11/3/2022), Disp: 42 tablet, Rfl: 0  •  famotidine (PEPCID) 20 mg tablet, Take 1 tablet (20 mg total) by mouth 2 (two) times a day (Patient not taking: Reported on 11/3/2022), Disp: 30 tablet, Rfl: 0  •  lidocaine (LIDODERM) 5 %, Apply 1 patch topically daily Remove & Discard patch within 12 hours or as directed by MD (Patient not taking: Reported on 11/3/2022), Disp: 30 patch, Rfl: 0  •  traMADol (Ultram) 50 mg tablet, Take 1 tablet (50 mg total) by mouth every 6 (six) hours as needed for moderate pain or severe pain (Patient not taking: Reported on 11/3/2022), Disp: 30 tablet, Rfl: 0    Current Allergies     Allergies as of 11/03/2022   • (No Known Allergies)            The following portions of the patient's history were reviewed and updated as appropriate: allergies, current medications, past family history, past medical history, past social history, past surgical history and problem list      Past Medical History:   Diagnosis Date   • GERD (gastroesophageal reflux disease)        Past Surgical History:   Procedure Laterality Date   • CHOLECYSTECTOMY     • CA ESOPHAGOGASTRODUODENOSCOPY TRANSORAL DIAGNOSTIC N/A 4/18/2018    Procedure: ESOPHAGOGASTRODUODENOSCOPY (EGD); Surgeon: Lowell George MD;  Location: [facility]; Service: Gastroenterology       Family History   Problem Relation Age of Onset   • No Known Problems Mother    • No Known Problems Father          Medications have been verified  Objective   /78   Pulse 85   Temp 98 6 °F (37 °C)   Resp 16   Wt 119 kg (263 lb 4 oz)   SpO2 97%   BMI 37 77 kg/m²        Physical Exam     Physical Exam  Cardiovascular:      Rate and Rhythm: Normal rate     Pulmonary:      Effort: Pulmonary effort is normal    Abdominal:      Comments: Mild erythema of umbilicus  Scant malodorous serosanguinous drainage noted    Genitourinary:     Comments: Draining pilonidal cyst of gluteal cleft  Skin:     General: Skin is warm and dry  Neurological:      Mental Status: He is alert     Psychiatric:         Mood and Affect: Mood normal

## 2022-11-07 ENCOUNTER — CONSULT (OUTPATIENT)
Dept: SURGERY | Facility: CLINIC | Age: 37
End: 2022-11-07

## 2022-11-07 VITALS
SYSTOLIC BLOOD PRESSURE: 130 MMHG | BODY MASS INDEX: 37.65 KG/M2 | TEMPERATURE: 98.3 F | HEIGHT: 70 IN | OXYGEN SATURATION: 97 % | DIASTOLIC BLOOD PRESSURE: 90 MMHG | HEART RATE: 80 BPM | RESPIRATION RATE: 18 BRPM | WEIGHT: 263 LBS

## 2022-11-07 DIAGNOSIS — L05.91 INFECTED PILONIDAL CYST: ICD-10-CM

## 2022-11-07 NOTE — ASSESSMENT & PLAN NOTE
43 y/o M w pilonidal cyst complicated by recurrent infection  Vss  Afebrile  Pilonidal cyst with two punctum in gluteal cleft  Area of induration approximately 5 cm superior and lateral to the punctum  No fluctuance or ongoing signs of cellulitis or infection  No drainage       Plan:   Continue your full course of keflex  Follow up in our office in 1 months to assess ongoing signs of inflammation and further discuss operative excision of pilonidal cyst

## 2022-11-07 NOTE — PROGRESS NOTES
Assessment/Plan:    Infected pilonidal cyst  45 y/o M w pilonidal cyst complicated by recurrent infection  Vss  Afebrile  Pilonidal cyst with two punctum in gluteal cleft  Area of induration approximately 5 cm superior and lateral to the punctum  No fluctuance or ongoing signs of cellulitis or infection  No drainage  Plan:   Continue your full course of keflex  Follow up in our office in 1 months to assess ongoing signs of inflammation and further discuss operative excision of pilonidal cyst            Subjective:      Patient ID: Karolina Mittal is a 44 y o  male  45 y/o M w PMH of obesity and GERD, who presents w recurrent pilonidal cyst infection  Reports recurrent drainage of the cyst for at least 10 years  He noted this has become more frequent, and is associated with purulent drainage every 2 weeks  He recently saw his PCP and started on keflex on 11/3  Drainage has resolved today  He denied any fever, chills or night sweats  He does not smoke or use nicotine products  He would like to have this lesion excised  Denied h/o MI or stroke  Denied use of anticoagulation or antiplatelet therapy  Office note from PCP reviewed from 11/3/22  The following portions of the patient's history were reviewed and updated as appropriate: allergies, current medications, past family history, past medical history, past social history, past surgical history and problem list     Review of Systems   Constitutional: Negative for chills and fever  HENT: Negative  Eyes: Negative  Respiratory: Negative  Cardiovascular: Negative  Gastrointestinal: Negative  Negative for abdominal distention, abdominal pain, diarrhea and nausea  Endocrine: Negative  Genitourinary: Negative  Musculoskeletal: Negative  Skin: Positive for wound  Recurrent drainage from pilonidal cyst    Allergic/Immunologic: Negative  Neurological: Negative  Hematological: Negative      Psychiatric/Behavioral: Negative  Objective:      /90 (BP Location: Left arm, Patient Position: Sitting, Cuff Size: Large)   Pulse 80   Temp 98 3 °F (36 8 °C) (Temporal)   Resp 18   Ht 5' 10" (1 778 m)   Wt 119 kg (263 lb)   SpO2 97%   BMI 37 74 kg/m²          Physical Exam  Vitals reviewed  Constitutional:       Appearance: Normal appearance  He is obese  HENT:      Head: Normocephalic and atraumatic  Nose: Nose normal       Mouth/Throat:      Mouth: Mucous membranes are moist    Eyes:      Extraocular Movements: Extraocular movements intact  Pupils: Pupils are equal, round, and reactive to light  Cardiovascular:      Rate and Rhythm: Normal rate  Pulses: Normal pulses  Pulmonary:      Effort: Pulmonary effort is normal    Abdominal:      General: There is no distension  Palpations: Abdomen is soft  Tenderness: There is no abdominal tenderness  Genitourinary:     Comments: Two punctum of pilonidal cyst sinus superior to coccyx  Small area of induration 5cm L superior and lateral of these two punctum  No fluctuance or cellulitis  No drainage  Musculoskeletal:         General: Normal range of motion  Cervical back: Normal range of motion and neck supple  Skin:     General: Skin is warm  Capillary Refill: Capillary refill takes less than 2 seconds  Neurological:      General: No focal deficit present  Mental Status: He is alert and oriented to person, place, and time     Psychiatric:         Mood and Affect: Mood normal

## 2022-12-09 ENCOUNTER — OFFICE VISIT (OUTPATIENT)
Dept: SURGERY | Facility: CLINIC | Age: 37
End: 2022-12-09

## 2022-12-09 VITALS
HEIGHT: 70 IN | WEIGHT: 261 LBS | HEART RATE: 79 BPM | OXYGEN SATURATION: 99 % | SYSTOLIC BLOOD PRESSURE: 138 MMHG | RESPIRATION RATE: 18 BRPM | TEMPERATURE: 97.9 F | BODY MASS INDEX: 37.37 KG/M2 | DIASTOLIC BLOOD PRESSURE: 80 MMHG

## 2022-12-09 DIAGNOSIS — L05.91 INFECTED PILONIDAL CYST: Primary | ICD-10-CM

## 2022-12-09 PROBLEM — F90.9 ADHD (ATTENTION DEFICIT HYPERACTIVITY DISORDER): Status: ACTIVE | Noted: 2017-11-08

## 2022-12-09 PROBLEM — R07.9 CHEST PAIN: Status: RESOLVED | Noted: 2018-04-10 | Resolved: 2022-12-09

## 2022-12-09 RX ORDER — SODIUM CHLORIDE, SODIUM LACTATE, POTASSIUM CHLORIDE, CALCIUM CHLORIDE 600; 310; 30; 20 MG/100ML; MG/100ML; MG/100ML; MG/100ML
125 INJECTION, SOLUTION INTRAVENOUS CONTINUOUS
OUTPATIENT
Start: 2022-12-09

## 2022-12-09 NOTE — PROGRESS NOTES
Assessment/Plan:    Infected pilonidal cyst  Patient returns to the office for routine scheduled follow-up  He denies new complaints referable to his pilonidal cyst   He has expressed a desire for definitive treatment  On physical examination he is well-nourished well-appearing male in no acute distress  Pleasant competent reliable as a historian  Inspection and palpation of the sacrum reveals a noninfected pilonidal cyst     The options benefits risks and alternatives to a pilonidal cystectomy with or without open packing were reviewed with the patient  Specific risk relates anesthesia, bleeding, infection and the need for open packing discussed  All questions answered to the satisfaction of the patient and informed written consent obtained to proceed  Subjective:      Patient ID: Fatoumata Kramer is a 44 y o  male  Pt is coming in the office for 1 mo f/u infected pilonidal cyst  He is still having minor pain and drainage from the cyst but no fever/chills RICO Deleon MA          Review of Systems   Constitutional: Negative for chills and fever  HENT: Negative for ear pain and sore throat  Eyes: Negative for pain and visual disturbance  Respiratory: Negative for cough and shortness of breath  Cardiovascular: Negative for chest pain and palpitations  Gastrointestinal: Negative for abdominal pain and vomiting  Genitourinary: Negative for dysuria and hematuria  Musculoskeletal: Negative for arthralgias and back pain  Skin: Negative for color change and rash  Neurological: Negative for seizures and syncope  All other systems reviewed and are negative  Objective:      /80 (BP Location: Left arm, Patient Position: Sitting, Cuff Size: Large)   Pulse 79   Temp 97 9 °F (36 6 °C) (Temporal)   Resp 18   Ht 5' 10" (1 778 m)   Wt 118 kg (261 lb)   SpO2 99%   BMI 37 45 kg/m²          Physical Exam  Vitals and nursing note reviewed     Constitutional:       Appearance: He is well-developed  HENT:      Head: Normocephalic and atraumatic  Eyes:      Conjunctiva/sclera: Conjunctivae normal       Pupils: Pupils are equal, round, and reactive to light  Cardiovascular:      Rate and Rhythm: Normal rate and regular rhythm  Pulmonary:      Effort: Pulmonary effort is normal       Breath sounds: Normal breath sounds  Abdominal:      General: Bowel sounds are normal       Palpations: Abdomen is soft  Musculoskeletal:         General: Normal range of motion  Cervical back: Normal range of motion and neck supple  Skin:     General: Skin is warm and dry  Comments: Pilonidal cyst present   Neurological:      Mental Status: He is alert and oriented to person, place, and time  Psychiatric:         Behavior: Behavior normal          Thought Content:  Thought content normal          Judgment: Judgment normal

## 2022-12-09 NOTE — ASSESSMENT & PLAN NOTE
Patient returns to the office for routine scheduled follow-up  He denies new complaints referable to his pilonidal cyst   He has expressed a desire for definitive treatment  On physical examination he is well-nourished well-appearing male in no acute distress  Pleasant competent reliable as a historian  Inspection and palpation of the sacrum reveals a noninfected pilonidal cyst     The options benefits risks and alternatives to a pilonidal cystectomy with or without open packing were reviewed with the patient  Specific risk relates anesthesia, bleeding, infection and the need for open packing discussed  All questions answered to the satisfaction of the patient and informed written consent obtained to proceed

## 2022-12-09 NOTE — H&P
Assessment/Plan:    Infected pilonidal cyst  Patient returns to the office for routine scheduled follow-up  He denies new complaints referable to his pilonidal cyst   He has expressed a desire for definitive treatment  On physical examination he is well-nourished well-appearing male in no acute distress  Pleasant competent reliable as a historian  Inspection and palpation of the sacrum reveals a noninfected pilonidal cyst     The options benefits risks and alternatives to a pilonidal cystectomy with or without open packing were reviewed with the patient  Specific risk relates anesthesia, bleeding, infection and the need for open packing discussed  All questions answered to the satisfaction of the patient and informed written consent obtained to proceed  Subjective:     Patient ID: Jj Cunha is a 44 y o  male  Pt is coming in the office for 1 mo f/u infected pilonidal cyst  He is still having minor pain and drainage from the cyst but no fever/chills RICO Deleon MA          Review of Systems   Constitutional: Negative for chills and fever  HENT: Negative for ear pain and sore throat  Eyes: Negative for pain and visual disturbance  Respiratory: Negative for cough and shortness of breath  Cardiovascular: Negative for chest pain and palpitations  Gastrointestinal: Negative for abdominal pain and vomiting  Genitourinary: Negative for dysuria and hematuria  Musculoskeletal: Negative for arthralgias and back pain  Skin: Negative for color change and rash  Neurological: Negative for seizures and syncope  All other systems reviewed and are negative  Objective:      /80 (BP Location: Left arm, Patient Position: Sitting, Cuff Size: Large)   Pulse 79   Temp 97 9 °F (36 6 °C) (Temporal)   Resp 18   Ht 5' 10" (1 778 m)   Wt 118 kg (261 lb)   SpO2 99%   BMI 37 45 kg/m²         Physical Exam  Vitals and nursing note reviewed     Constitutional:       Appearance: He is well-developed  HENT:      Head: Normocephalic and atraumatic  Eyes:      Conjunctiva/sclera: Conjunctivae normal       Pupils: Pupils are equal, round, and reactive to light  Cardiovascular:      Rate and Rhythm: Normal rate and regular rhythm  Pulmonary:      Effort: Pulmonary effort is normal       Breath sounds: Normal breath sounds  Abdominal:      General: Bowel sounds are normal       Palpations: Abdomen is soft  Musculoskeletal:         General: Normal range of motion  Cervical back: Normal range of motion and neck supple  Skin:     General: Skin is warm and dry  Comments: Pilonidal cyst present   Neurological:      Mental Status: He is alert and oriented to person, place, and time  Psychiatric:         Behavior: Behavior normal          Thought Content:  Thought content normal          Judgment: Judgment normal

## 2022-12-09 NOTE — LETTER
December 9, 2022     [address] [place], [place] [id number]    Patient: Elder Araiza   YOB: 1985   Date of Visit: 12/9/2022       Dear Dr Mayra Monaco: Thank you for referring Elder Araiza to me for evaluation  Below are my notes for this consultation  If you have questions, please do not hesitate to call me  I look forward to following your patient along with you           Sincerely,        Leandra Neri MD        CC: No Recipients

## 2023-02-17 ENCOUNTER — OFFICE VISIT (OUTPATIENT)
Dept: URGENT CARE | Facility: CLINIC | Age: 38
End: 2023-02-17

## 2023-02-17 VITALS
OXYGEN SATURATION: 97 % | SYSTOLIC BLOOD PRESSURE: 140 MMHG | HEART RATE: 108 BPM | TEMPERATURE: 98.6 F | DIASTOLIC BLOOD PRESSURE: 88 MMHG | WEIGHT: 260.38 LBS | RESPIRATION RATE: 16 BRPM | BODY MASS INDEX: 37.36 KG/M2

## 2023-02-17 DIAGNOSIS — J03.90 TONSILLITIS: Primary | ICD-10-CM

## 2023-02-17 DIAGNOSIS — R09.81 NASAL CONGESTION: ICD-10-CM

## 2023-02-17 LAB — S PYO AG THROAT QL: NEGATIVE

## 2023-02-17 RX ORDER — AZITHROMYCIN 250 MG/1
TABLET, FILM COATED ORAL
Qty: 6 TABLET | Refills: 0 | Status: SHIPPED | OUTPATIENT
Start: 2023-02-17 | End: 2023-02-21

## 2023-02-17 NOTE — PATIENT INSTRUCTIONS
Check or sign up for   [facility] my Chart to view your results  We do not call patient's with negative results  Go directly home after today's visit, quarantine until you receive a negative result  Isolate from others as much as possible until your result comes back  If you have a positive COVID result you need to quarantine at home for a minimum of 5 days from the date your symptoms started  You may end your quarantine when you are fever free without medications to reduce fever (e g  acetaminophen/Tylenol) for 24 hours  Anyone whom you have been in close regular contact (unmasked > 15 minute intervals) since you began having symptoms should be tested within 5-7 days of your positive test regardless of vaccination status or symptoms  Masks should be worn at all times whenever indoors until 10 days after your exposure or positive result  If you have a positive flu result you need to quarantine at home until fever free and off fever lowering medications for 24 hours  Recommend masking for 5 days after return to normal activities  Anyone who may have been in close regular contact with since symptom onset only needs to be tested if they began having symptoms  Recommend over the counter antihistamines such as Claratin, Allegra, Zyrtec, or Benadryl for congestion  You may also use over the counter nasal sprays such as Flonase for this  Over the counter lozenges such as Cepacol, Ricola, Halls, or Chloroseptic can be used for sore throat symptoms  Recommend Vitamin C, Vitamin D3, multivitamin and Zinc for immune support  Discuss dosing recommendations with pharmacist especially in children and infants  If your symptoms worsen or you develop shortness of breath report to the nearest emergency room  Take antibiotics as prescribed  Make sure to take all the antibiotic even after you start feeling better   If you stop them too soon, you risk developing a resistant infection that is more difficult and expensive to treat  Never save antibiotics or take antibiotics without the recommendation of a healthcare provider or dental professional  Note that if you are taking birth control medications, antibiotics can decrease their effectiveness  Recommend abstinence or back up method while on antibiotics and for 3-5 days after completing the antibiotic course  You are considered contagious until you have been on the antibiotic for 24 hours  You should not share food or drinks with others and should not kiss on the mouth in that time  You should also stay home from work or school to avoid spreading the infection to others  You need to switch to a brand new, never used toothbrush after 48 hours (or 2 days on the antibiotic) to prevent giving strep back to yourself again  If symptoms are not improved after 2-3 days on the antibiotics, follow-up with your primary care provider  If symptoms worsen or new symptoms such as drooling, difficulty swallowing, voice changes, anterior neck pain, or jaw pain develop report to the emergency room as these are symptoms of an abscess having formed in your throat or neck

## 2023-02-17 NOTE — PROGRESS NOTES
[facility]        NAME: Gardenia Perez is a 44 y o  male  : 1985    MRN: [id number]  DATE: 2023  TIME: 8:57 AM    Assessment and Plan   Tonsillitis [J03 90]  1  Tonsillitis  POCT rapid strepA    azithromycin (ZITHROMAX) 250 mg tablet    CANCELED: Throat culture      2  Nasal congestion  Cov/Flu-Collected at [facility] or [facility]      Pt presents with symptoms and examination concerning for strep, COVID, and flu  Rapid strep in clinic is negative  COVID and flu testing performed, [facility]  Discussed symptomatic treatments and quarantine protocols should either COVID or flu be positive  Pt has had fever, with exudative tonsillitis, and anterior cervical LAD meeting 3/5 Centor criteria  He will be treated empirically for strep with Azithromycin as he has surgery scheduled in 10 days  Patient Instructions   Patient Instructions   Check or sign up for [facility] my Chart to view your results  We do not call patient's with negative results  Go directly home after today's visit, quarantine until you receive a negative result  Isolate from others as much as possible until your result comes back  If you have a positive COVID result you need to quarantine at home for a minimum of 5 days from the date your symptoms started  You may end your quarantine when you are fever free without medications to reduce fever (e g  acetaminophen/Tylenol) for 24 hours  Anyone whom you have been in close regular contact (unmasked > 15 minute intervals) since you began having symptoms should be tested within 5-7 days of your positive test regardless of vaccination status or symptoms  Masks should be worn at all times whenever indoors until 10 days after your exposure or positive result  If you have a positive flu result you need to quarantine at home until fever free and off fever lowering medications for 24 hours    Recommend masking for 5 days after return to normal activities  Anyone who may have been in close regular contact with since symptom onset only needs to be tested if they began having symptoms  Recommend over the counter antihistamines such as Claratin, Allegra, Zyrtec, or Benadryl for congestion  You may also use over the counter nasal sprays such as Flonase for this  Over the counter lozenges such as Cepacol, Ricola, Halls, or Chloroseptic can be used for sore throat symptoms  Recommend Vitamin C, Vitamin D3, multivitamin and Zinc for immune support  Discuss dosing recommendations with pharmacist especially in children and infants  If your symptoms worsen or you develop shortness of breath report to the nearest emergency room  • Take antibiotics as prescribed  Make sure to take all the antibiotic even after you start feeling better  If you stop them too soon, you risk developing a resistant infection that is more difficult and expensive to treat  Never save antibiotics or take antibiotics without the recommendation of a healthcare provider or dental professional  Note that if you are taking birth control medications, antibiotics can decrease their effectiveness  Recommend abstinence or back up method while on antibiotics and for 3-5 days after completing the antibiotic course  • You are considered contagious until you have been on the antibiotic for 24 hours  You should not share food or drinks with others and should not kiss on the mouth in that time  You should also stay home from work or school to avoid spreading the infection to others  • You need to switch to a brand new, never used toothbrush after 48 hours (or 2 days on the antibiotic) to prevent giving strep back to yourself again  • If symptoms are not improved after 2-3 days on the antibiotics, follow-up with your primary care provider     • If symptoms worsen or new symptoms such as drooling, difficulty swallowing, voice changes, anterior neck pain, or jaw pain develop report to the emergency room as these are symptoms of an abscess having formed in your throat or neck  Follow up with PCP in 3-5 days  Proceed to  ER if symptoms worsen  Chief Complaint     Chief Complaint   Patient presents with   • Sore Throat     Stuffy ears x 1-2 days  Fever 101, chills  Mild cough with post nasal drip  Taking ibuprofen for sx  Negative home covid test yesterday  History of Present Illness       44year old male presents iwht complaint of fever and sore throat x 1-2 days  Pt notes some ear pain and pressure as well as mild congestion and cough  Pt had negative COVID test at home yesterday  He has been taking Ibuprofen with minimal benefit  Review of Systems   Review of Systems   Constitutional: Positive for fever (T max 101)  Negative for chills and fatigue  HENT: Positive for congestion, postnasal drip, rhinorrhea and sore throat  Negative for trouble swallowing and voice change  Respiratory: Positive for cough  Negative for shortness of breath  Cardiovascular: Negative for chest pain  Gastrointestinal: Negative for diarrhea, nausea and vomiting  Musculoskeletal: Negative for myalgias  Neurological: Negative for headaches           Current Medications       Current Outpatient Medications:   •  azithromycin (ZITHROMAX) 250 mg tablet, Take 2 tablets today then 1 tablet daily x 4 days, Disp: 6 tablet, Rfl: 0  •  esomeprazole (NexIUM) 20 mg capsule, Take 20 mg by mouth every morning before breakfast, Disp: , Rfl:     Current Allergies     Allergies as of 02/17/2023   • (No Known Allergies)            The following portions of the patient's history were reviewed and updated as appropriate: allergies, current medications, past family history, past medical history, past social history, past surgical history and problem list      Past Medical History:   Diagnosis Date   • GERD (gastroesophageal reflux disease)        Past Surgical History:   Procedure Laterality Date   • CHOLECYSTECTOMY     • DE ESOPHAGOGASTRODUODENOSCOPY TRANSORAL DIAGNOSTIC N/A 4/18/2018    Procedure: ESOPHAGOGASTRODUODENOSCOPY (EGD); Surgeon: Shannen Saavedra MD;  Location: [facility]; Service: Gastroenterology       Family History   Problem Relation Age of Onset   • No Known Problems Mother    • Cancer Father    • No Known Problems Brother    • No Known Problems Daughter          Medications have been verified  Objective   /88   Pulse (!) 108   Temp 98 6 °F (37 °C)   Resp 16   Wt 118 kg (260 lb 6 oz)   SpO2 97%   BMI 37 36 kg/m²   No LMP for male patient  Physical Exam     Physical Exam  Vitals and nursing note reviewed  Constitutional:       General: He is not in acute distress  Appearance: Normal appearance  He is not ill-appearing or toxic-appearing  HENT:      Head: Normocephalic and atraumatic  Jaw: No trismus  Right Ear: Tympanic membrane, ear canal and external ear normal  There is no impacted cerumen  No foreign body  Left Ear: Tympanic membrane, ear canal and external ear normal  There is no impacted cerumen  No foreign body  Nose: No nasal deformity, mucosal edema, congestion or rhinorrhea  Right Nostril: No foreign body, epistaxis or occlusion  Left Nostril: No foreign body, epistaxis or occlusion  Right Turbinates: Not enlarged, swollen or pale  Left Turbinates: Not enlarged, swollen or pale  Mouth/Throat:      Lips: Pink  No lesions  Mouth: Mucous membranes are moist  No injury, oral lesions or angioedema  Dentition: Normal dentition  Tongue: No lesions  Tongue does not deviate from midline  Palate: No mass and lesions  Pharynx: Uvula midline  Posterior oropharyngeal erythema present  No pharyngeal swelling, oropharyngeal exudate or uvula swelling  Tonsils: Tonsillar exudate present  No tonsillar abscesses  1+ on the right  1+ on the left     Eyes:      General: Lids are normal  Gaze aligned appropriately  No allergic shiner  Extraocular Movements: Extraocular movements intact  Neck:      Trachea: Trachea and phonation normal    Cardiovascular:      Rate and Rhythm: Normal rate and regular rhythm  Heart sounds: Normal heart sounds, S1 normal and S2 normal  Heart sounds not distant  No murmur heard  No friction rub  No gallop  Pulmonary:      Effort: Pulmonary effort is normal       Breath sounds: Normal breath sounds  No decreased breath sounds, wheezing, rhonchi or rales  Comments: Patient speaking in full sentences with no increased respiratory effort  No audible wheezing or stridor  Lymphadenopathy:      Cervical: Cervical adenopathy (tender) present  Right cervical: Superficial cervical adenopathy present  Left cervical: Superficial cervical adenopathy present  Skin:     General: Skin is warm and dry  Neurological:      Mental Status: He is alert and oriented to person, place, and time  Coordination: Coordination is intact  Gait: Gait is intact  Psychiatric:         Attention and Perception: Attention and perception normal          Mood and Affect: Mood and affect normal          Speech: Speech normal          Behavior: Behavior is cooperative  Note: Portions of this record may have been created with voice recognition software  Occasional wrong word or "sound a like" substitutions may have occurred due to the inherent limitations of voice recognition software  Please read the chart carefully and recognize, using context, where substitutions have occurred  *

## 2023-02-19 LAB
FLUAV RNA RESP QL NAA+PROBE: NEGATIVE
FLUBV RNA RESP QL NAA+PROBE: NEGATIVE
SARS-COV-2 RNA RESP QL NAA+PROBE: NEGATIVE

## 2023-02-21 NOTE — PRE-PROCEDURE INSTRUCTIONS
Pre-Surgery Instructions:   Medication Instructions   • azithromycin (ZITHROMAX) 250 mg tablet Will be done by DOS   • esomeprazole (NexIUM) 20 mg capsule Take day of surgery  NPO after midnight, meds in am with sips of water  Understands when to expect preop phone call  Remove all jewelry  Advised of visitation policy  Before your operation, you play an important role in decreasing your risk for infection by washing with special antiseptic soap  This is an effective way to reduce bacteria on the skin which may help to prevent infections at the surgical site  Please read the following directions in advance  1  In the week before your operation purchase a 4 ounce bottle of antiseptic soap containing chlorhexidine gluconate 4%  Some brand names include: Aplicare, Endure, and Hibiclens  The cost is usually less than $5 00  · For your convenience, the [address] [place] [address] carries the soap  · It may also be available at your doctor's office or pre-admission testing center, and at most retail pharmacies  · If you are allergic or sensitive to soaps containing chlorhexidine gluconate (CHG), please let your doctor know so another antiseptic soap can be suggested  · CHG antiseptic soap is for external use only  2      The day before your operation follow these directions carefully to get ready  · Place clean lines (sheets) on your bed; you should sleep on clean sheets after your evening shower  · Get clean towels and washcloths ready - you need enough for 2 showers  · Set aside clean underwear, pajamas, and clothing to wear after the shower  Reminders:  · DO NOT use any other soap or body rinse on your skin during or after the antiseptic showers  · DO NOT use lotion , powder, deodorant, or perfume/aftershave of any kind on your skin after your antiseptic shower  · DO NOT shave any body parts in the 24 hours/the day before your operation    · DO NOT get the antiseptic soap in your eyes, ears, nose, mouth, or vaginal area  3      You will need to shower the night before AND the morning of your Surgery  Shower 1:  · The evening before your operation, take the fist shower  · First, shampoo your hair with regular shampoo and rinse it completely before you use the anitseptic soap  After washing and rinsing your hair, rinse your body  · Next, use a clean wash cloth to apply the antiseptic soap and wash your body from the neck down to your toes using 1/2 bottle of the antiseptic soap  You will use the other 1/2 bottle for the second shower  · Clean the area where your incision will be; later this area well for about 2 minutes  · If you ar having head or neck surgery, wash areas with the antiseptic soap  · Rinse yourself completely with running water  · Use a clean towel to dry off  · Wear clean underwear and clothing/pajamas  Shower 2:  · The Morning of your operation, take the second shower following the same steps as Shower 1 using the second 1/2 of the bottle of antiseptic soap  · Use clean cloths and towels to was and dry yourself off  · Wear clean underwear and clothing

## 2023-02-28 ENCOUNTER — HOSPITAL ENCOUNTER (OUTPATIENT)
Facility: HOSPITAL | Age: 38
Setting detail: OUTPATIENT SURGERY
Discharge: HOME | End: 2023-02-28
Attending: SURGERY | Admitting: SURGERY

## 2023-02-28 ENCOUNTER — ANESTHESIA EVENT (OUTPATIENT)
Dept: PERIOP | Facility: HOSPITAL | Age: 38
End: 2023-02-28

## 2023-02-28 ENCOUNTER — ANESTHESIA (OUTPATIENT)
Dept: PERIOP | Facility: HOSPITAL | Age: 38
End: 2023-02-28

## 2023-02-28 VITALS
HEART RATE: 85 BPM | DIASTOLIC BLOOD PRESSURE: 79 MMHG | WEIGHT: 260 LBS | HEIGHT: 70 IN | RESPIRATION RATE: 22 BRPM | OXYGEN SATURATION: 92 % | TEMPERATURE: 97.8 F | BODY MASS INDEX: 37.22 KG/M2 | SYSTOLIC BLOOD PRESSURE: 132 MMHG

## 2023-02-28 DIAGNOSIS — L05.91 INFECTED PILONIDAL CYST: ICD-10-CM

## 2023-02-28 RX ORDER — CEFAZOLIN SODIUM 2 G/50ML
2000 SOLUTION INTRAVENOUS ONCE
Status: COMPLETED | OUTPATIENT
Start: 2023-02-28 | End: 2023-02-28

## 2023-02-28 RX ORDER — LIDOCAINE HYDROCHLORIDE 20 MG/ML
INJECTION, SOLUTION EPIDURAL; INFILTRATION; INTRACAUDAL; PERINEURAL AS NEEDED
Status: DISCONTINUED | OUTPATIENT
Start: 2023-02-28 | End: 2023-02-28

## 2023-02-28 RX ORDER — HYDROMORPHONE HCL/PF 1 MG/ML
0.2 SYRINGE (ML) INJECTION
Status: DISCONTINUED | OUTPATIENT
Start: 2023-02-28 | End: 2023-02-28 | Stop reason: HOSPADM

## 2023-02-28 RX ORDER — KETOROLAC TROMETHAMINE 30 MG/ML
INJECTION, SOLUTION INTRAMUSCULAR; INTRAVENOUS AS NEEDED
Status: DISCONTINUED | OUTPATIENT
Start: 2023-02-28 | End: 2023-02-28

## 2023-02-28 RX ORDER — HYDROCODONE BITARTRATE AND ACETAMINOPHEN 5; 325 MG/1; MG/1
1 TABLET ORAL EVERY 6 HOURS PRN
Status: DISCONTINUED | OUTPATIENT
Start: 2023-02-28 | End: 2023-02-28 | Stop reason: HOSPADM

## 2023-02-28 RX ORDER — SUCCINYLCHOLINE/SOD CL,ISO/PF 100 MG/5ML
SYRINGE (ML) INTRAVENOUS AS NEEDED
Status: DISCONTINUED | OUTPATIENT
Start: 2023-02-28 | End: 2023-02-28

## 2023-02-28 RX ORDER — FENTANYL CITRATE 50 UG/ML
INJECTION, SOLUTION INTRAMUSCULAR; INTRAVENOUS AS NEEDED
Status: DISCONTINUED | OUTPATIENT
Start: 2023-02-28 | End: 2023-02-28

## 2023-02-28 RX ORDER — FENTANYL CITRATE/PF 50 MCG/ML
25 SYRINGE (ML) INJECTION
Status: DISCONTINUED | OUTPATIENT
Start: 2023-02-28 | End: 2023-02-28 | Stop reason: HOSPADM

## 2023-02-28 RX ORDER — ONDANSETRON 2 MG/ML
INJECTION INTRAMUSCULAR; INTRAVENOUS AS NEEDED
Status: DISCONTINUED | OUTPATIENT
Start: 2023-02-28 | End: 2023-02-28

## 2023-02-28 RX ORDER — PROPOFOL 10 MG/ML
INJECTION, EMULSION INTRAVENOUS AS NEEDED
Status: DISCONTINUED | OUTPATIENT
Start: 2023-02-28 | End: 2023-02-28

## 2023-02-28 RX ORDER — HYDROCODONE BITARTRATE AND ACETAMINOPHEN 5; 325 MG/1; MG/1
1 TABLET ORAL EVERY 6 HOURS PRN
Qty: 15 TABLET | Refills: 0 | Status: SHIPPED | OUTPATIENT
Start: 2023-02-28 | End: 2023-03-10

## 2023-02-28 RX ORDER — HYDROCODONE BITARTRATE AND ACETAMINOPHEN 5; 325 MG/1; MG/1
2 TABLET ORAL EVERY 6 HOURS PRN
Status: DISCONTINUED | OUTPATIENT
Start: 2023-02-28 | End: 2023-02-28 | Stop reason: HOSPADM

## 2023-02-28 RX ORDER — ONDANSETRON 2 MG/ML
4 INJECTION INTRAMUSCULAR; INTRAVENOUS ONCE AS NEEDED
Status: DISCONTINUED | OUTPATIENT
Start: 2023-02-28 | End: 2023-02-28 | Stop reason: HOSPADM

## 2023-02-28 RX ORDER — SODIUM CHLORIDE, SODIUM LACTATE, POTASSIUM CHLORIDE, CALCIUM CHLORIDE 600; 310; 30; 20 MG/100ML; MG/100ML; MG/100ML; MG/100ML
125 INJECTION, SOLUTION INTRAVENOUS CONTINUOUS
Status: DISCONTINUED | OUTPATIENT
Start: 2023-02-28 | End: 2023-02-28 | Stop reason: HOSPADM

## 2023-02-28 RX ORDER — DEXAMETHASONE SODIUM PHOSPHATE 10 MG/ML
INJECTION, SOLUTION INTRAMUSCULAR; INTRAVENOUS AS NEEDED
Status: DISCONTINUED | OUTPATIENT
Start: 2023-02-28 | End: 2023-02-28

## 2023-02-28 RX ORDER — LIDOCAINE HYDROCHLORIDE AND EPINEPHRINE 10; 10 MG/ML; UG/ML
INJECTION, SOLUTION INFILTRATION; PERINEURAL AS NEEDED
Status: DISCONTINUED | OUTPATIENT
Start: 2023-02-28 | End: 2023-02-28 | Stop reason: HOSPADM

## 2023-02-28 RX ORDER — BUPIVACAINE HYDROCHLORIDE 5 MG/ML
INJECTION, SOLUTION PERINEURAL AS NEEDED
Status: DISCONTINUED | OUTPATIENT
Start: 2023-02-28 | End: 2023-02-28 | Stop reason: HOSPADM

## 2023-02-28 RX ORDER — MIDAZOLAM HYDROCHLORIDE 2 MG/2ML
INJECTION, SOLUTION INTRAMUSCULAR; INTRAVENOUS AS NEEDED
Status: DISCONTINUED | OUTPATIENT
Start: 2023-02-28 | End: 2023-02-28

## 2023-02-28 RX ORDER — SODIUM CHLORIDE, SODIUM LACTATE, POTASSIUM CHLORIDE, CALCIUM CHLORIDE 600; 310; 30; 20 MG/100ML; MG/100ML; MG/100ML; MG/100ML
75 INJECTION, SOLUTION INTRAVENOUS CONTINUOUS
Status: DISCONTINUED | OUTPATIENT
Start: 2023-02-28 | End: 2023-02-28 | Stop reason: HOSPADM

## 2023-02-28 RX ORDER — ACETAMINOPHEN 325 MG/1
650 TABLET ORAL EVERY 6 HOURS PRN
Status: DISCONTINUED | OUTPATIENT
Start: 2023-02-28 | End: 2023-02-28 | Stop reason: HOSPADM

## 2023-02-28 RX ORDER — MAGNESIUM HYDROXIDE 1200 MG/15ML
LIQUID ORAL AS NEEDED
Status: DISCONTINUED | OUTPATIENT
Start: 2023-02-28 | End: 2023-02-28 | Stop reason: HOSPADM

## 2023-02-28 RX ADMIN — MIDAZOLAM 2 MG: 1 INJECTION INTRAMUSCULAR; INTRAVENOUS at 11:28

## 2023-02-28 RX ADMIN — FENTANYL CITRATE 100 MCG: 50 INJECTION, SOLUTION INTRAMUSCULAR; INTRAVENOUS at 11:34

## 2023-02-28 RX ADMIN — KETOROLAC TROMETHAMINE 30 MG: 30 INJECTION, SOLUTION INTRAMUSCULAR at 12:17

## 2023-02-28 RX ADMIN — SODIUM CHLORIDE, SODIUM LACTATE, POTASSIUM CHLORIDE, AND CALCIUM CHLORIDE: .6; .31; .03; .02 INJECTION, SOLUTION INTRAVENOUS at 12:10

## 2023-02-28 RX ADMIN — PROPOFOL 200 MG: 10 INJECTION, EMULSION INTRAVENOUS at 11:34

## 2023-02-28 RX ADMIN — Medication 140 MG: at 11:34

## 2023-02-28 RX ADMIN — ONDANSETRON 4 MG: 2 INJECTION INTRAMUSCULAR; INTRAVENOUS at 12:17

## 2023-02-28 RX ADMIN — CEFAZOLIN SODIUM 2000 MG: 2 SOLUTION INTRAVENOUS at 11:25

## 2023-02-28 RX ADMIN — SODIUM CHLORIDE, SODIUM LACTATE, POTASSIUM CHLORIDE, AND CALCIUM CHLORIDE 125 ML/HR: .6; .31; .03; .02 INJECTION, SOLUTION INTRAVENOUS at 10:21

## 2023-02-28 RX ADMIN — PROPOFOL 100 MG: 10 INJECTION, EMULSION INTRAVENOUS at 12:34

## 2023-02-28 RX ADMIN — LIDOCAINE HYDROCHLORIDE 100 MG: 20 INJECTION, SOLUTION EPIDURAL; INFILTRATION; INTRACAUDAL; PERINEURAL at 11:34

## 2023-02-28 RX ADMIN — FENTANYL CITRATE 25 MCG: 50 INJECTION, SOLUTION INTRAMUSCULAR; INTRAVENOUS at 12:34

## 2023-02-28 RX ADMIN — DEXAMETHASONE SODIUM PHOSPHATE 10 MG: 10 INJECTION, SOLUTION INTRAMUSCULAR; INTRAVENOUS at 11:45

## 2023-02-28 RX ADMIN — FENTANYL CITRATE 25 MCG: 50 INJECTION, SOLUTION INTRAMUSCULAR; INTRAVENOUS at 12:42

## 2023-02-28 RX ADMIN — FENTANYL CITRATE 50 MCG: 50 INJECTION, SOLUTION INTRAMUSCULAR; INTRAVENOUS at 12:03

## 2023-02-28 NOTE — DISCHARGE INSTRUCTIONS
Try to leave dressing on for 48 hours after surgery as long as it doesn't become soiled  Please sponge bath only during this time  After 48 hours you may remove the dressing  Gently scrub with soap/water in the shower  Out of the shower pat it dry and use dry ABD pad (at any pharmacy or on [facility]) to cover the incision  You can just tuck it into your underwear or lightly tape it in position  The pad is to absorb any drainage and reduce irritation from the sutures  Clean it at least once a day, more frequently if soiled by BM or from sweat/dirt  If you notice any worsening pain, redness, warmth, or drainage please call the office  Will expect that drainage slows and stops in the first few days after surgery  For pain you may take ibuprofen 600 mg every 6 hours scheduled for 3 days then as needed  You can also take acetaminophen 650 mg every 6 hours scheduled for 3 days then as needed  For ongoing pain you can alternate ibuprofen and acetaminophen every 3 hours  If pain not controlled with this regimen you can substitute Norco for the acetaminophen, but do not take both  Ice packs may be helpful, 20 min on, 20 min off alternating and monitoring skin

## 2023-02-28 NOTE — OP NOTE
OPERATIVE REPORT  PATIENT NAME: Shimon Sanchez    :  1985  MRN: [id number]  Pt Location: [facility] OR ROOM 01    SURGERY DATE: 2023    Surgeon(s) and Role:     Ni Noble MD - Primary     * Tomas Mills PA-C - Assisting  The PA was necessary to provide expert assistance; i e  in the form of providing optimal exposure with retraction, suturing, and assistance with dissection in order to perform the most efficient operation and in order to optimize patient safety in the abscence of a qualified surgical resident  Preop Diagnosis:  Infected pilonidal cyst [L05 91]    Post-Op Diagnosis Codes:     * Infected pilonidal cyst [L05 91]    Procedure(s):  EXCISION PILONIDAL CYST    Specimen(s):  ID Type Source Tests Collected by Time Destination   1 : pilonidal cyst Tissue Pilonidal Cyst/Sinus TISSUE Andi Rodrigez MD 2023 1207        Estimated Blood Loss:   Minimal    Drains:  * No LDAs found *    Anesthesia Type:   General    Operative Indications:  Infected pilonidal cyst [L05 91]  The patient is a 13-year-old male presenting with a chronic recurrent pilonidal cyst for which definitive treatment by pilonidal cystectomy is now indicated  Operative Findings:  Patient had a chronically inflamed pilonidal cyst   The area excised measured 13 cm in length by 3 5 cm in width by 3 7 cm in depth  The wound was able to be reapproximated without tension in 2 layers with deep sutures of 0 Vicryl and vertical mattress sutures of 3-0 nylon  Complications:   None    Procedure and Technique:  Patient taken to the operating room where they are properly identified monitored and anesthetized  They received antibiotics perioperatively  Sequential compression device used for deep vein thrombosis prophylaxis  Patient rolled into the prone position  Careful attention paid to patient positioning  The buttock prepped and draped under sterile conditions using aseptic technique    Timeout performed  Skin infiltrated 1% lidocaine local anesthesia with epinephrine  The pilonidal cyst interrogated with lacrimal probes  Skin incised in an ellipse to include all chronically inflamed tissue  Dissection carried down to the presacral fascia and the pilonidal cyst dissected out with combination of sharp dissection and electrocautery  Specimen examined on the back table after which hemostasis was ensured with electrocautery  The wound irrigated  The wound closed in 2 layers with deep sutures of simple interrupted 0 Vicryl on deep fascia  The skin reapproximated with vertical mattress sutures of 3-0 nylon  The wound infiltrated half percent Marcaine  Wound dressed  Patient rolled supine and  Extubated and taken to recovery in stable condition     I was present for the entire procedure    Patient Disposition:  PACU         SIGNATURE: Elliot Harris MD  DATE: February 28, 2023  TIME: 12:21 PM

## 2023-02-28 NOTE — ANESTHESIA POSTPROCEDURE EVALUATION
Post-Op Assessment Note    CV Status:  Stable  Pain Score: 0    Pain management: adequate     Mental Status:  Arousable   Hydration Status:  Stable   PONV Controlled:  None   Airway Patency:  Patent      Post Op Vitals Reviewed: Yes      Staff: CRNA         No notable events documented      BP  157/94   Temp   98 8   Pulse  99   Resp   16   SpO2   97%

## 2023-02-28 NOTE — H&P
H&P Exam - General Surgery   Jas Simon 44 y o  male MRN: [id number]  Unit/Bed#: [facility] Encounter: [id number]    Assessment/Plan     Assessment:  Pilonidal cyst   Plan:  OR for pilonidal cystectomy possible closure vs open packing  History of Present Illness   HPI:  Jas Simon is a 44 y o  male who presents for scheduled pilonidal cystectomy  No changes since seen in the office  Review of Systems   All other systems reviewed and are negative  Historical Information   Past Medical History:   Diagnosis Date   • ADHD    • GERD (gastroesophageal reflux disease)      Past Surgical History:   Procedure Laterality Date   • CHOLECYSTECTOMY     • TX ESOPHAGOGASTRODUODENOSCOPY TRANSORAL DIAGNOSTIC N/A 4/18/2018    Procedure: ESOPHAGOGASTRODUODENOSCOPY (EGD); Surgeon: Jamila Rodrigues MD;  Location: [facility]; Service: Gastroenterology     Social History   Social History     Substance and Sexual Activity   Alcohol Use Yes    Comment: 6 x year     Social History     Substance and Sexual Activity   Drug Use Not Currently   • Types: Marijuana     Social History     Tobacco Use   Smoking Status Every Day   • Packs/day: 0 25   • Types: Cigarettes   Smokeless Tobacco Never     E-Cigarette/Vaping   • E-Cigarette Use Never User      E-Cigarette/Vaping Substances   • Nicotine No    • THC No    • CBD No    • Flavoring No    • Other No      Family History:   Family History   Problem Relation Age of Onset   • No Known Problems Mother    • Cancer Father    • No Known Problems Brother    • No Known Problems Daughter        Meds/Allergies   PTA meds:   Prior to Admission Medications   Prescriptions Last Dose Informant Patient Reported?  Taking?   esomeprazole (NexIUM) 20 mg capsule 2/27/2023  Yes Yes   Sig: Take 20 mg by mouth every morning before breakfast      Facility-Administered Medications: None     No Known Allergies    Objective   First Vitals:   Blood Pressure: (!) 184/84 (02/28/23 1011)  Pulse: 90 (02/28/23 1011)  Temperature: 98 7 °F (37 1 °C) (02/28/23 1011)  Temp Source: Temporal (02/28/23 1011)  Respirations: 18 (02/28/23 1011)  Height: 5' 10" (177 8 cm) (02/28/23 1011)  Weight - Scale: 118 kg (260 lb) (02/28/23 1011)  SpO2: 94 % (02/28/23 1011)    Current Vitals:   Blood Pressure: (!) 184/84 (02/28/23 1011)  Pulse: 90 (02/28/23 1011)  Temperature: 98 7 °F (37 1 °C) (02/28/23 1011)  Temp Source: Temporal (02/28/23 1011)  Respirations: 18 (02/28/23 1011)  Height: 5' 10" (177 8 cm) (02/28/23 1011)  Weight - Scale: 118 kg (260 lb) (02/28/23 1011)  SpO2: 94 % (02/28/23 1011)    No intake or output data in the 24 hours ending 02/28/23 1039    Invasive Devices     Peripheral Intravenous Line  Duration           Peripheral IV 02/28/23 Left;Ventral (anterior) Hand <1 day                Physical Exam  Vitals and nursing note reviewed  Constitutional:       General: He is not in acute distress  Appearance: He is well-developed  He is not diaphoretic  HENT:      Head: Normocephalic and atraumatic  Eyes:      Conjunctiva/sclera: Conjunctivae normal       Pupils: Pupils are equal, round, and reactive to light  Pulmonary:      Effort: No respiratory distress  Musculoskeletal:         General: Normal range of motion  Cervical back: Normal range of motion  Skin:     General: Skin is warm and dry  Capillary Refill: Capillary refill takes less than 2 seconds  Neurological:      Mental Status: He is alert and oriented to person, place, and time  Psychiatric:         Behavior: Behavior normal          Lab Results: I have personally reviewed pertinent lab results  Imaging: I have personally reviewed pertinent reports  EKG, Pathology, and Other Studies: I have personally reviewed pertinent reports  Code Status: No Order  Advance Directive and Living Will:      Power of :    POLST:      Counseling / Coordination of Care  Total floor / unit time spent today 15 minutes    Greater than 50% of total time was spent with the patient and / or family counseling and / or coordination of care  A description of the counseling / coordination of care: treatment planning

## 2023-03-01 ENCOUNTER — TELEPHONE (OUTPATIENT)
Dept: SURGERY | Facility: CLINIC | Age: 38
End: 2023-03-01

## 2023-03-06 ENCOUNTER — TELEPHONE (OUTPATIENT)
Dept: SURGERY | Facility: CLINIC | Age: 38
End: 2023-03-06

## 2023-03-06 NOTE — TELEPHONE ENCOUNTER
Patient called stating he stat in his car the wrong way and may have open the inc because its bleeding  Msg sent to Dr Devi Wilson via Raghu Gordillo text   Dr Quinonez [place] spoke with patient via phone

## 2023-03-15 ENCOUNTER — OFFICE VISIT (OUTPATIENT)
Dept: SURGERY | Facility: CLINIC | Age: 38
End: 2023-03-15

## 2023-03-15 VITALS — TEMPERATURE: 98.2 F

## 2023-03-15 DIAGNOSIS — L05.91 INFECTED PILONIDAL CYST: Primary | ICD-10-CM

## 2023-03-15 NOTE — PROGRESS NOTES
Post-Op Note - General Surgery   Gerhardt Brow 44 y o  male MRN: [id number]  Encounter: [id number]    Assessment/Plan    Infected pilonidal cyst  Doing well post-op, now 2 weeks out  Had a skin tear due to traction injury with bleeding for a few days that has improved  On exam the incisions is healing well and closed 90% with only 1 5 cm open area near the bottom  No signs of infection  Sutures removed wound treated with betadine, irrigation, and Silvasorb gel/gauze pad  Advised daily wound care with soap scrub and bacitracin to promote healing  Will see back in 2 weeks to recheck  Copy of op/path provided  Diagnoses and all orders for this visit:    Infected pilonidal cyst        Subjective      Chief Complaint   Patient presents with   • Post-op     po pilonidal cystectomy 2/28/2023     Doing well 2 weeks out  Had a traction injury with the wound opening/bleeding for a few days that has improved  Review of Systems   Skin: Positive for wound  The following portions of the patient's history were reviewed and updated as appropriate: allergies, current medications, past family history, past medical history, past social history, past surgical history and problem list     Objective      Temperature 98 2 °F (36 8 °C), temperature source Temporal    Physical Exam  Vitals and nursing note reviewed  Constitutional:       General: He is not in acute distress  Appearance: He is well-developed  He is not diaphoretic  HENT:      Head: Normocephalic and atraumatic  Eyes:      Conjunctiva/sclera: Conjunctivae normal       Pupils: Pupils are equal, round, and reactive to light  Pulmonary:      Effort: No respiratory distress  Musculoskeletal:         General: Normal range of motion  Cervical back: Normal range of motion  Skin:     General: Skin is warm and dry  Capillary Refill: Capillary refill takes less than 2 seconds  Comments: Incision clean, healing well, no signs of infection  Open towards bottom about 1 5 cm long  Neurological:      Mental Status: He is alert and oriented to person, place, and time     Psychiatric:         Behavior: Behavior normal          Signature:  Tomas Mills PA-C  Date: 3/15/2023 Time: 9:53 AM

## 2023-03-15 NOTE — ASSESSMENT & PLAN NOTE
Doing well post-op, now 2 weeks out  Had a skin tear due to traction injury with bleeding for a few days that has improved  On exam the incisions is healing well and closed 90% with only 1 5 cm open area near the bottom  No signs of infection  Sutures removed wound treated with betadine, irrigation, and Silvasorb gel/gauze pad  Advised daily wound care with soap scrub and bacitracin to promote healing  Will see back in 2 weeks to recheck  Copy of op/path provided

## 2023-03-29 ENCOUNTER — OFFICE VISIT (OUTPATIENT)
Dept: SURGERY | Facility: CLINIC | Age: 38
End: 2023-03-29

## 2023-03-29 VITALS — TEMPERATURE: 98.1 F

## 2023-03-29 DIAGNOSIS — L05.91 INFECTED PILONIDAL CYST: Primary | ICD-10-CM

## 2023-03-29 NOTE — PROGRESS NOTES
Post-Op Note - General Surgery   Mone Morton 44 y o  male MRN: [id number]  Encounter: [id number]    Assessment/Plan    Infected pilonidal cyst  Now 1 month post-op  Has persistent open wound towards bottom of incision with frequent mild bleeding  No associate pain  Keeping it clean and using Bacitracin  Wound evaluated with Dr Mckenzie Fish and treated with silver nitrate application today in the office  Continue current wound care  Will see back in 1 week for repeat treatment  Questions answered, agreeable to plan  Diagnoses and all orders for this visit:    Infected pilonidal cyst        Subjective      Chief Complaint   Patient presents with   • Follow-up      f/u on wound check s/p pilonidal cystectomy 2/28/2023     Doing well  Open wound persists  With some mild bleeding  Review of Systems   Skin: Positive for wound  All other systems reviewed and are negative  The following portions of the patient's history were reviewed and updated as appropriate: allergies, current medications, past family history, past medical history, past social history, past surgical history and problem list     Objective      Temperature 98 1 °F (36 7 °C)  Physical Exam  Vitals and nursing note reviewed  Constitutional:       General: He is not in acute distress  Appearance: He is well-developed  He is not diaphoretic  HENT:      Head: Normocephalic and atraumatic  Eyes:      Conjunctiva/sclera: Conjunctivae normal       Pupils: Pupils are equal, round, and reactive to light  Pulmonary:      Effort: No respiratory distress  Musculoskeletal:         General: Normal range of motion  Cervical back: Normal range of motion  Skin:     General: Skin is warm and dry  Capillary Refill: Capillary refill takes less than 2 seconds  Comments: Incision mostly well healed  Bottom portion is open to subcutaneous tissue  Friable tissue with mild bleeding   Only limited tunneling at 6 o'clock, nothing directly deep, laterally, or superior  No signs of cellulitis or abscess  No visible sutures  Neurological:      Mental Status: He is alert and oriented to person, place, and time     Psychiatric:         Behavior: Behavior normal          Signature:  Tomas Mills PA-C  Date: 3/29/2023 Time: 11:35 AM

## 2023-03-29 NOTE — ASSESSMENT & PLAN NOTE
Now 1 month post-op  Has persistent open wound towards bottom of incision with frequent mild bleeding  No associate pain  Keeping it clean and using Bacitracin  Wound evaluated with Dr Mir Hoang and treated with silver nitrate application today in the office  Continue current wound care  Will see back in 1 week for repeat treatment  Questions answered, agreeable to plan

## 2023-04-04 ENCOUNTER — TELEPHONE (OUTPATIENT)
Dept: SURGERY | Facility: CLINIC | Age: 38
End: 2023-04-04

## 2023-04-04 NOTE — TELEPHONE ENCOUNTER
Patient called cancel his appt he states he is doing good, and provider said in his last visit that he could cancel if not needed to be seen  Patient also said if paige wants him to be seen he is willing to make an appt

## 2023-04-07 ENCOUNTER — OFFICE VISIT (OUTPATIENT)
Dept: SURGERY | Facility: CLINIC | Age: 38
End: 2023-04-07

## 2023-04-07 VITALS
HEART RATE: 74 BPM | HEIGHT: 70 IN | RESPIRATION RATE: 20 BRPM | DIASTOLIC BLOOD PRESSURE: 80 MMHG | SYSTOLIC BLOOD PRESSURE: 126 MMHG | TEMPERATURE: 97.7 F | BODY MASS INDEX: 37.51 KG/M2 | WEIGHT: 262 LBS | OXYGEN SATURATION: 97 %

## 2023-04-07 DIAGNOSIS — L05.91 INFECTED PILONIDAL CYST: Primary | ICD-10-CM

## 2023-04-07 NOTE — PROGRESS NOTES
"Post-Op Note - General Surgery   Mei Bennett 44 y o  male MRN: [id number]  Encounter: [id number]    Assessment/Plan    Infected pilonidal cyst  Following silver nitrate application last week he has been doing well however experienced recurrent bleeding this week for which return visit was scheduled  On exam the lower portion of the wound is open without tunneling, overall decreased in size since previous  Silver nitrate applied in the office and tolerated without pain  Advised continued local wound care and follow-up next week for repeat application the expectation of spontaneous closure over the next few weeks  Diagnoses and all orders for this visit:    Infected pilonidal cyst        Subjective      Chief Complaint   Patient presents with   • Follow-up     open and bleeding, one week follow up silver nitrate , s/p pilonidal cystectomy 2/28/2023     Patient came in today for a open and bleeding wound where his pilonidal cyst was removed in the OR on 02/28/23  Patient suffers from discomfort but denies foul smell, severe pain or fevers/chills  Dickson DOMINGO MA       Review of Systems   Skin: Positive for wound  All other systems reviewed and are negative  The following portions of the patient's history were reviewed and updated as appropriate: allergies, current medications, past family history, past medical history, past social history, past surgical history and problem list     Objective      Blood pressure 126/80, pulse 74, temperature 97 7 °F (36 5 °C), temperature source Temporal, resp  rate 20, height 5' 10\" (1 778 m), weight 119 kg (262 lb), SpO2 97 %  Physical Exam  Vitals and nursing note reviewed  Constitutional:       General: He is not in acute distress  Appearance: He is well-developed  He is not diaphoretic  HENT:      Head: Normocephalic and atraumatic  Eyes:      Conjunctiva/sclera: Conjunctivae normal       Pupils: Pupils are equal, round, and reactive to light     Pulmonary: " Effort: No respiratory distress  Musculoskeletal:         General: Normal range of motion  Cervical back: Normal range of motion  Skin:     General: Skin is warm and dry  Capillary Refill: Capillary refill takes less than 2 seconds  Comments: Lower portion of his gluteal cleft incision is open measuring approximately 1 5 cm in diameter  Superficial without tunneling  No signs of infection  Friable granulation tissue with mild bleeding noted  Treated with silver nitrate and tolerated well  Gently packed with gauze  Neurological:      Mental Status: He is alert and oriented to person, place, and time     Psychiatric:         Behavior: Behavior normal          Signature:  Tomas Mills PA-C  Date: 4/7/2023 Time: 11:56 AM

## 2023-04-07 NOTE — ASSESSMENT & PLAN NOTE
Following silver nitrate application last week he has been doing well however experienced recurrent bleeding this week for which return visit was scheduled  On exam the lower portion of the wound is open without tunneling, overall decreased in size since previous  Silver nitrate applied in the office and tolerated without pain  Advised continued local wound care and follow-up next week for repeat application the expectation of spontaneous closure over the next few weeks

## 2023-04-26 ENCOUNTER — OFFICE VISIT (OUTPATIENT)
Dept: SURGERY | Facility: CLINIC | Age: 38
End: 2023-04-26

## 2023-04-26 VITALS — TEMPERATURE: 98 F

## 2023-04-26 DIAGNOSIS — L05.91 INFECTED PILONIDAL CYST: Primary | ICD-10-CM

## 2023-04-26 NOTE — PROGRESS NOTES
Post-Op Note - General Surgery   Sammi Rojas 44 y o  male MRN: [id number]  Encounter: [id number]    Assessment/Plan    Infected pilonidal cyst  Now 2 months s/p OR debridement and primary closure with partial wound dehiscence that has not fully responded to daily wound care and intermittent silver nitrate  Consultation obtained in the office with Dr Tamara Nieves who identified tunneling at 10 and 12 o'clock for which incision and debridement advised  With informed consent the skin was opened revealing small pockets of granulation tissue that were debrided with #15 scalpel, treated with silver nitrate, then packed wet to dry  Wound was hemostatic at procedure completion  Instructions provided for daily packing changes at home and close follow-up in the office to monitor progress  Questions answered, agreeable to plan  Diagnoses and all orders for this visit:    Infected pilonidal cyst        Subjective      Chief Complaint   Patient presents with   • Follow-up     1 wk f/u silver nitrate application, pilonidal cystectomy 2/28/2023     43 yo M now 2 months s/p OR pilonidal cystectomy  Post-op course complicated by limited superficial wound dehiscence  Has been packing daily and we're treating with silver nitrate weekly  Reports ongoing intermittent pain/bleeding/drainage  Review of Systems   Skin: Positive for wound  The following portions of the patient's history were reviewed and updated as appropriate: allergies, current medications, past family history, past medical history, past social history, past surgical history and problem list     Objective      Temperature 98 °F (36 7 °C), temperature source Temporal      Physical Exam  Vitals and nursing note reviewed  Constitutional:       General: He is not in acute distress  Appearance: He is well-developed  He is not diaphoretic  HENT:      Head: Normocephalic and atraumatic     Eyes:      Conjunctiva/sclera: Conjunctivae normal       Pupils: Pupils are equal, round, and reactive to light  Pulmonary:      Effort: No respiratory distress  Musculoskeletal:         General: Normal range of motion  Cervical back: Normal range of motion  Skin:     General: Skin is warm and dry  Capillary Refill: Capillary refill takes less than 2 seconds  Comments: Open wound about 2 cm long, 0 5 cm deep  Tunneling noted at 12 o'clock about 2 cm with serous fluid  Tunneling noted at 6 o'clock about 1 cm without fluid  Neurological:      Mental Status: He is alert and oriented to person, place, and time  Psychiatric:         Behavior: Behavior normal        Debridement   Universal Protocol:  Consent: Verbal consent obtained  Risks and benefits: risks, benefits and alternatives were discussed  Consent given by: patient  Patient understanding: patient states understanding of the procedure being performed  Patient identity confirmed: verbally with patient      Performed by: PA  Debridement type: surgical  Level of debridement: subcutaneous tissue  Pain control: lidocaine 1%  Post-debridement measurements  Length (cm): 4  Width (cm): 1  Depth (cm): 1 5  Percent debrided: 50%  Surface Area (cm^2): 4  Area debrided (cm^2): 2  Volume (cm^3): 6  Tissue and other material debrided: hypergranulation and subcutaneous tissue  Devitalized tissue debrided: slough  Instrument(s) utilized: blade  Bleeding: medium  Hemostasis obtained with: pressure and silver nitrate  Response to treatment: procedure was tolerated well  Debridement Comments: Prepped with betadine  Anesthetized with lidocaine 1% with epinephrine  Incision made at 12 and 6 o'clock position  Exposed granulation tissue debrided with #15 blade  Moderate bleeding controlled with pressure and silver nitrate  Wound then irrigated then packed wet to dry and bandaged  Tolerated well          Signature:  Tomas Mills PA-C  Date: 4/26/2023 Time: 12:09 PM

## 2023-04-26 NOTE — ASSESSMENT & PLAN NOTE
Now 2 months s/p OR debridement and primary closure with partial wound dehiscence that has not fully responded to daily wound care and intermittent silver nitrate  Consultation obtained in the office with Dr Glo Aguilera who identified tunneling at 10 and 12 o'clock for which incision and debridement advised  With informed consent the skin was opened revealing small pockets of granulation tissue that were debrided with #15 scalpel, treated with silver nitrate, then packed wet to dry  Wound was hemostatic at procedure completion  Instructions provided for daily packing changes at home and close follow-up in the office to monitor progress  Questions answered, agreeable to plan

## 2023-04-28 ENCOUNTER — OFFICE VISIT (OUTPATIENT)
Dept: SURGERY | Facility: CLINIC | Age: 38
End: 2023-04-28

## 2023-04-28 DIAGNOSIS — L05.91 INFECTED PILONIDAL CYST: Primary | ICD-10-CM

## 2023-04-28 NOTE — PROGRESS NOTES
Progress Note - General Surgery   Paul Ward 44 y o  male MRN: [id number]  Encounter: [id number]    Assessment/Plan    Infected pilonidal cyst  After incision and debridement the wound appears clean, open and healthy  No active bleeding  Flushed and repacked with his wife who will be providing wound care at home  Will plan to see back in 2 weeks for wound check, sooner as needed  Diagnoses and all orders for this visit:    Infected pilonidal cyst        Subjective       Chief Complaint   Patient presents with   • Follow-up     packing change, s/p debridement and silver nitrate trearment of pilonidal cystectomy 4/26/2023      44 M here for wound check after repeat debridement of pilonidal cyst scar due to prolonged wound healing  No bleeding post-op, pain controlled  Review of Systems   Skin: Positive for wound  The following portions of the patient's history were reviewed and updated as appropriate: allergies, current medications, past family history, past medical history, past social history, past surgical history and problem list     Objective      There were no vitals taken for this visit  Physical Exam  Vitals and nursing note reviewed  Constitutional:       General: He is not in acute distress  Appearance: He is well-developed  He is not diaphoretic  HENT:      Head: Normocephalic and atraumatic  Eyes:      Conjunctiva/sclera: Conjunctivae normal       Pupils: Pupils are equal, round, and reactive to light  Pulmonary:      Effort: No respiratory distress  Musculoskeletal:         General: Normal range of motion  Cervical back: Normal range of motion  Skin:     General: Skin is warm and dry  Capillary Refill: Capillary refill takes less than 2 seconds  Comments: Wound is clean and open  No signs of infection or residual granulation tissue  Repacked with wet to dry  Neurological:      Mental Status: He is alert and oriented to person, place, and time  Psychiatric:         Behavior: Behavior normal          Signature:  Tomas Mills PA-C  Date: 4/28/2023 Time: 1:31 PM

## 2023-04-28 NOTE — ASSESSMENT & PLAN NOTE
After incision and debridement the wound appears clean, open and healthy  No active bleeding  Flushed and repacked with his wife who will be providing wound care at home  Will plan to see back in 2 weeks for wound check, sooner as needed

## 2023-05-12 ENCOUNTER — OFFICE VISIT (OUTPATIENT)
Dept: SURGERY | Facility: CLINIC | Age: 38
End: 2023-05-12

## 2023-05-12 VITALS — TEMPERATURE: 97.9 F

## 2023-05-12 DIAGNOSIS — L05.91 INFECTED PILONIDAL CYST: Primary | ICD-10-CM

## 2023-05-12 NOTE — PROGRESS NOTES
Post-Op Note - General Surgery   Daylin Resendiz 44 y o  male MRN: [id number]  Encounter: [id number]    Assessment/Plan    Infected pilonidal cyst  Wound is clean, lower pole has reepithelialized  Approximately 2 and half centimeters by 1-1/2 cm in dimension  Advised continued once daily packing changes with wet-to-dry gauze  We will see back in 2 weeks  Diagnoses and all orders for this visit:    Infected pilonidal cyst        Subjective      Chief Complaint   Patient presents with   • Follow-up     2 wk f/u on packing change, debridement and silver nitrate trearment, s/p pilonidal cystectomy 02/28/2023     42-year-old male here for follow-up after I&D/debridement of his pilonidal cystectomy incision which had delayed healing  Review of Systems   Skin: Positive for wound  All other systems reviewed and are negative  The following portions of the patient's history were reviewed and updated as appropriate: allergies, current medications, past family history, past medical history, past social history, past surgical history and problem list     Objective      Temperature 97 9 °F (36 6 °C), temperature source Temporal    Physical Exam  Vitals and nursing note reviewed  Constitutional:       General: He is not in acute distress  Appearance: He is well-developed  He is not diaphoretic  HENT:      Head: Normocephalic and atraumatic  Eyes:      Conjunctiva/sclera: Conjunctivae normal       Pupils: Pupils are equal, round, and reactive to light  Pulmonary:      Effort: No respiratory distress  Musculoskeletal:         General: Normal range of motion  Cervical back: Normal range of motion  Skin:     General: Skin is warm and dry  Capillary Refill: Capillary refill takes less than 2 seconds  Comments: Open clean superficial without signs of infection   Neurological:      Mental Status: He is alert and oriented to person, place, and time     Psychiatric:         Behavior: Behavior normal          Signature:  Tomas Mills PA-C  Date: 5/12/2023 Time: 1:06 PM

## 2023-05-12 NOTE — ASSESSMENT & PLAN NOTE
Wound is clean, lower pole has reepithelialized  Approximately 2 and half centimeters by 1-1/2 cm in dimension  Advised continued once daily packing changes with wet-to-dry gauze  We will see back in 2 weeks

## 2023-05-26 ENCOUNTER — OFFICE VISIT (OUTPATIENT)
Dept: SURGERY | Facility: CLINIC | Age: 38
End: 2023-05-26
Payer: COMMERCIAL

## 2023-05-26 VITALS — TEMPERATURE: 98.9 F

## 2023-05-26 DIAGNOSIS — T14.8XXA OPEN WOUND: Primary | ICD-10-CM

## 2023-05-26 PROCEDURE — 99212 OFFICE O/P EST SF 10 MIN: CPT | Performed by: PHYSICIAN ASSISTANT

## 2023-06-07 ENCOUNTER — OFFICE VISIT (OUTPATIENT)
Dept: SURGERY | Facility: CLINIC | Age: 38
End: 2023-06-07
Payer: COMMERCIAL

## 2023-06-07 VITALS — TEMPERATURE: 97.9 F

## 2023-06-07 DIAGNOSIS — T14.8XXA OPEN WOUND: Primary | ICD-10-CM

## 2023-06-07 PROCEDURE — 99212 OFFICE O/P EST SF 10 MIN: CPT | Performed by: PHYSICIAN ASSISTANT

## 2023-06-07 NOTE — ASSESSMENT & PLAN NOTE
On exam today the opening measures 4 x 4 mm and tunnels about 1 5 cm deep  There is no purulence or signs of infection  No exposed hypertrophic granulation tissue  We will continue with current wound care plan with once daily packing changes using a soap scrub and dry 2 x 2 and trial of a water resistant bandage  We will add small amount of SilvaSorb gel to the wound base to help promote clean healing environment  Questions answered  Plan to see back in 2 weeks for recheck

## 2023-06-07 NOTE — ASSESSMENT & PLAN NOTE
Open wound of pilonidal region is clean and dry without signs of infection or active drainage  No tunneling identified on exam  No hypertrophic granulation tissue to warrant additional silver nitrate application  Continue packing changes and follow-up in 2 weeks to re-assess

## 2023-06-07 NOTE — PROGRESS NOTES
Progress Note - General Surgery   Yaquelin Toni 44 y o  male MRN: [id number]  Encounter: [id number]    Assessment/Plan    Open wound  On exam today the opening measures 4 x 4 mm and tunnels about 1 5 cm deep  There is no purulence or signs of infection  No exposed hypertrophic granulation tissue  We will continue with current wound care plan with once daily packing changes using a soap scrub and dry 2 x 2 and trial of a water resistant bandage  We will add small amount of SilvaSorb gel to the wound base to help promote clean healing environment  Questions answered  Plan to see back in 2 weeks for recheck  There are no diagnoses linked to this encounter  Subjective       Chief Complaint   Patient presents with   • Follow-up     2 wk f/u on packing change, s/p pilonidal cystectomy 02/28/2023      50-year-old male here for follow-up wound check of open pilonidal wound status post pilonidal cystectomy  States the wound is decreasing in size  Review of Systems   Skin: Positive for wound  The following portions of the patient's history were reviewed and updated as appropriate: allergies, current medications, past family history, past medical history, past social history, past surgical history and problem list     Objective      Temperature 97 9 °F (36 6 °C), temperature source Temporal    Physical Exam  Vitals and nursing note reviewed  Constitutional:       General: He is not in acute distress  Appearance: He is well-developed  He is not diaphoretic  HENT:      Head: Normocephalic and atraumatic  Eyes:      Conjunctiva/sclera: Conjunctivae normal       Pupils: Pupils are equal, round, and reactive to light  Pulmonary:      Effort: No respiratory distress  Musculoskeletal:         General: Normal range of motion  Cervical back: Normal range of motion  Skin:     General: Skin is warm and dry  Capillary Refill: Capillary refill takes less than 2 seconds  Comments:  At the lower third of the scar and there is an open portion measuring 4 x 4 mm and 1 5 cm deep  There is no signs of infection or purulent discharge  Repacked with portion of a 2 x 2 gauze and SilvaSorb gel  Neurological:      Mental Status: He is alert and oriented to person, place, and time     Psychiatric:         Behavior: Behavior normal          Signature:  Tomas Mills PA-C  Date: 6/7/2023 Time: 10:52 AM

## 2023-06-21 ENCOUNTER — OFFICE VISIT (OUTPATIENT)
Dept: SURGERY | Facility: CLINIC | Age: 38
End: 2023-06-21
Payer: COMMERCIAL

## 2023-06-21 VITALS — TEMPERATURE: 98 F

## 2023-06-21 DIAGNOSIS — T14.8XXA OPEN WOUND: Primary | ICD-10-CM

## 2023-06-21 PROCEDURE — 99212 OFFICE O/P EST SF 10 MIN: CPT | Performed by: PHYSICIAN ASSISTANT

## 2023-06-22 NOTE — PROGRESS NOTES
Progress Note - General Surgery   Feliberto Santos 44 y o  male MRN: [id number]  Encounter: [id number]    Assessment/Plan    Open wound  Pilonidal cystectomy scar and open wound nearly fully healed  There is a 5 mm deep defect that appears to be epithelialized and for which I advised no further packing  Instructed to clean and dress daily and if no additional drainage noted may then leave open to air  Questions answered, follow-up as needed  Diagnoses and all orders for this visit:    Open wound      Subjective       Chief Complaint   Patient presents with   • Follow-up     2 wk f/u on packing change, s/p pilonidal cystectomy 02/28/2023      43 yo M presenting for wound check  Review of Systems   Skin: Positive for wound  All other systems reviewed and are negative  The following portions of the patient's history were reviewed and updated as appropriate: allergies, current medications, past family history, past medical history, past social history, past surgical history and problem list     Objective      Temperature 98 °F (36 7 °C), temperature source Temporal    Physical Exam  Vitals and nursing note reviewed  Constitutional:       General: He is not in acute distress  Appearance: He is well-developed  He is not diaphoretic  HENT:      Head: Normocephalic and atraumatic  Eyes:      Conjunctiva/sclera: Conjunctivae normal       Pupils: Pupils are equal, round, and reactive to light  Pulmonary:      Effort: No respiratory distress  Musculoskeletal:         General: Normal range of motion  Cervical back: Normal range of motion  Skin:     General: Skin is warm and dry  Capillary Refill: Capillary refill takes less than 2 seconds  Comments: 5 mm deep defect of pilonidal scar that appears epithelialized without tunneling or drainage  Neurological:      Mental Status: He is alert and oriented to person, place, and time     Psychiatric:         Behavior: Behavior normal  Signature:  Tomas Mills PA-C  Date: 6/22/2023 Time: 9:26 AM

## 2023-06-22 NOTE — ASSESSMENT & PLAN NOTE
Pilonidal cystectomy scar and open wound nearly fully healed  There is a 5 mm deep defect that appears to be epithelialized and for which I advised no further packing  Instructed to clean and dress daily and if no additional drainage noted may then leave open to air  Questions answered, follow-up as needed

## 2023-06-23 ENCOUNTER — OFFICE VISIT (OUTPATIENT)
Dept: SURGERY | Facility: CLINIC | Age: 38
End: 2023-06-23
Payer: COMMERCIAL

## 2023-06-23 VITALS
RESPIRATION RATE: 18 BRPM | TEMPERATURE: 97.9 F | HEART RATE: 87 BPM | SYSTOLIC BLOOD PRESSURE: 124 MMHG | OXYGEN SATURATION: 99 % | DIASTOLIC BLOOD PRESSURE: 82 MMHG

## 2023-06-23 DIAGNOSIS — L05.01 PILONIDAL ABSCESS: Primary | ICD-10-CM

## 2023-06-23 PROCEDURE — 87070 CULTURE OTHR SPECIMN AEROBIC: CPT | Performed by: PHYSICIAN ASSISTANT

## 2023-06-23 PROCEDURE — 99213 OFFICE O/P EST LOW 20 MIN: CPT | Performed by: PHYSICIAN ASSISTANT

## 2023-06-23 PROCEDURE — 10060 I&D ABSCESS SIMPLE/SINGLE: CPT | Performed by: SURGERY

## 2023-06-23 PROCEDURE — 87077 CULTURE AEROBIC IDENTIFY: CPT | Performed by: PHYSICIAN ASSISTANT

## 2023-06-23 PROCEDURE — 87186 SC STD MICRODIL/AGAR DIL: CPT | Performed by: PHYSICIAN ASSISTANT

## 2023-06-23 PROCEDURE — 87205 SMEAR GRAM STAIN: CPT | Performed by: PHYSICIAN ASSISTANT

## 2023-06-23 NOTE — PROGRESS NOTES
Progress Note - General Surgery   Franky Avery 44 y o  male MRN: [id number]  Encounter: [id number]    Assessment/Plan    Pilonidal abscess  Now 4 months after pilonidal cystectomy in the OR, Bogdan Chan returns with swelling and pain at upper gluteal cleft  Exam consistent with tender fluid collection for which I&D advised and performed in the office under local anesthesia with informed verbal content  Findings include 10 cm cavity with purulent/sanguinous fluid and curettage granulation tissue of the cavity  Culture taken, debridement performed, and packed with gauze with instructions for packing changes at home reviewed  Will plan to see back in 1 week for wound check  Diagnoses and all orders for this visit:    Pilonidal abscess        Subjective       Chief Complaint   Patient presents with   • Follow-up     pain with swelling at scar area, s/p 2 wk f/u on packing change, s/p pilonidal cystectomy 02/28/2023      45 yo M hx pilonidal cystectomy in February with protracted post-op wound healing now presenting with acute swelling and tenderness at top of scar  Review of Systems   All other systems reviewed and are negative  The following portions of the patient's history were reviewed and updated as appropriate: allergies, current medications, past family history, past medical history, past social history, past surgical history and problem list     Objective      Blood pressure 124/82, pulse 87, temperature 97 9 °F (36 6 °C), temperature source Temporal, resp  rate 18, SpO2 99 %  Physical Exam  Vitals and nursing note reviewed  Constitutional:       General: He is not in acute distress  Appearance: He is well-developed  He is not diaphoretic  HENT:      Head: Normocephalic and atraumatic  Eyes:      Conjunctiva/sclera: Conjunctivae normal       Pupils: Pupils are equal, round, and reactive to light  Pulmonary:      Effort: No respiratory distress     Musculoskeletal:         General: Normal range of motion  Cervical back: Normal range of motion  Skin:     General: Skin is warm and dry  Capillary Refill: Capillary refill takes less than 2 seconds  Comments: At the top of the scar in the pilonidal region there is a 6 cm area of fluctuance swelling and tenderness suggestive of a pilonidal abscess   Neurological:      Mental Status: He is alert and oriented to person, place, and time  Psychiatric:         Behavior: Behavior normal        Incision and Drainage    Date/Time: 6/23/2023 10:00 AM    Performed by: Victoriano Al MD  Authorized by: Victoriano Al MD  [place] Protocol:  Procedure performed by: (Assisted by Sophie Santiago)  Consent: Verbal consent obtained  Risks and benefits: risks, benefits and alternatives were discussed  Consent given by: patient  Patient understanding: patient states understanding of the procedure being performed  Patient identity confirmed: verbally with patient      Patient location:  Clinic  Location:     Type:  Abscess    Size:  6 cm    Location:  Trunk  Pre-procedure details:     Skin preparation:  Betadine  Anesthesia (see MAR for exact dosages): Anesthesia method:  Local infiltration    Local anesthetic:  Lidocaine 1% WITH epi  Procedure details:     Complexity:  Intermediate    Incision types:  Single straight    Scalpel blade:  15    Approach:  Open    Incision depth:  Subcutaneous    Wound management:  Probed and deloculated, irrigated with saline, extensive cleaning and debrided    Drainage:  Bloody and purulent    Drainage amount:  Copious    Wound treatment:  Packing placed    Packing material: 4x4 gauze  Post-procedure details:     Patient tolerance of procedure: Tolerated well, no immediate complications  Comments:      Prepped with Betadine swabs x2  Field block administered using 1% lidocaine with epinephrine and bicarbonate, approximately 40 mL utilized    Single straight incision made down the upper portion of the gluteal cleft scar and entering a pilonidal sinus with copious bloody purulent fluid which was cultured  Fluid evacuated and cavity debrided with a circular blade curette then packed with 4 x 4 gauze and dressed  Tolerated well no immediate complications          Signature:  Tomas Mills PA-C  Date: 6/23/2023 Time: 10:57 AM

## 2023-06-23 NOTE — ASSESSMENT & PLAN NOTE
Now 4 months after pilonidal cystectomy in the OR, Miguel Verduzco returns with swelling and pain at upper gluteal cleft  Exam consistent with tender fluid collection for which I&D advised and performed in the office under local anesthesia with informed verbal content  Findings include 10 cm cavity with purulent/sanguinous fluid and curettage granulation tissue of the cavity  Culture taken, debridement performed, and packed with gauze with instructions for packing changes at home reviewed  Will plan to see back in 1 week for wound check

## 2023-06-26 ENCOUNTER — TELEPHONE (OUTPATIENT)
Dept: SURGERY | Facility: CLINIC | Age: 38
End: 2023-06-26

## 2023-06-26 LAB
BACTERIA WND AEROBE CULT: ABNORMAL
GRAM STN SPEC: ABNORMAL
GRAM STN SPEC: ABNORMAL

## 2023-06-26 NOTE — TELEPHONE ENCOUNTER
Please call patient  Lisa Hernandez had a wound culture done on 06/23/2023  He is concerned with the lab results which he saw today  He would like to know what needs to be done at this time

## 2023-06-30 ENCOUNTER — OFFICE VISIT (OUTPATIENT)
Dept: SURGERY | Facility: CLINIC | Age: 38
End: 2023-06-30

## 2023-06-30 VITALS — TEMPERATURE: 98 F

## 2023-06-30 DIAGNOSIS — L05.01 PILONIDAL ABSCESS: Primary | ICD-10-CM

## 2023-06-30 PROCEDURE — 99024 POSTOP FOLLOW-UP VISIT: CPT | Performed by: PHYSICIAN ASSISTANT

## 2023-06-30 NOTE — PROGRESS NOTES
Post-Op Note - General Surgery   Narinder Chapa 44 y o  male MRN: [id number]  Encounter: [id number]    Assessment/Plan    Pilonidal abscess  Doing well 1 week after I&D and debridement of a recurrent pilonidal abscess  Packing the wound and cleaning it daily  On examination today the the wound is clean open with a bed of granulation tissue  Advised to continue daily wound care and follow-up in 2 weeks for reassessment  Questions answered, agreeable to plan  Diagnoses and all orders for this visit:    Pilonidal abscess        Subjective      Chief Complaint   Patient presents with   • Post-op     1 week f/u on wound check/ packing change/ po I&D pilonidal cyst  06/23/23     Patient came in today for a 1 week f/u on wound check/ packing change/ po I&D pilonidal cyst  06/23/23  Patient states he isn't having any trouble with the packing changes at home  Patient states there mild drainage but denies pain or fevers/chills  Patient states it just uncomfortable and it itches  Review of Systems   Skin: Positive for wound  All other systems reviewed and are negative  The following portions of the patient's history were reviewed and updated as appropriate: allergies, current medications, past family history, past medical history, past social history, past surgical history and problem list     Objective      Temperature 98 °F (36 7 °C), temperature source Temporal    Physical Exam  Vitals and nursing note reviewed  Constitutional:       General: He is not in acute distress  Appearance: He is well-developed  He is not diaphoretic  HENT:      Head: Normocephalic and atraumatic  Eyes:      Conjunctiva/sclera: Conjunctivae normal       Pupils: Pupils are equal, round, and reactive to light  Pulmonary:      Effort: No respiratory distress  Musculoskeletal:         General: Normal range of motion  Cervical back: Normal range of motion  Skin:     General: Skin is warm and dry        Capillary Refill: Capillary refill takes less than 2 seconds  Comments: Wound is clean open dry and granulating  Signs of ongoing infection  Was cleaned and repacked wet-to-dry  Zinc oxide applied to periwound due to itching  Neurological:      Mental Status: He is alert and oriented to person, place, and time     Psychiatric:         Behavior: Behavior normal          Signature:  Tomas Mills PA-C  Date: 6/30/2023 Time: 10:23 AM

## 2023-06-30 NOTE — ASSESSMENT & PLAN NOTE
Doing well 1 week after I&D and debridement of a recurrent pilonidal abscess  Packing the wound and cleaning it daily  On examination today the the wound is clean open with a bed of granulation tissue  Advised to continue daily wound care and follow-up in 2 weeks for reassessment  Questions answered, agreeable to plan

## 2023-07-14 ENCOUNTER — OFFICE VISIT (OUTPATIENT)
Dept: SURGERY | Facility: CLINIC | Age: 38
End: 2023-07-14
Payer: COMMERCIAL

## 2023-07-14 VITALS
DIASTOLIC BLOOD PRESSURE: 84 MMHG | RESPIRATION RATE: 18 BRPM | HEIGHT: 70 IN | WEIGHT: 259 LBS | HEART RATE: 83 BPM | TEMPERATURE: 97.9 F | BODY MASS INDEX: 37.08 KG/M2 | SYSTOLIC BLOOD PRESSURE: 116 MMHG | OXYGEN SATURATION: 98 %

## 2023-07-14 DIAGNOSIS — T14.8XXA OPEN WOUND: Primary | ICD-10-CM

## 2023-07-14 PROCEDURE — 99212 OFFICE O/P EST SF 10 MIN: CPT | Performed by: PHYSICIAN ASSISTANT

## 2023-07-14 NOTE — PROGRESS NOTES
Progress Note - General Surgery   Vira Angeles 44 y.o. male MRN: [id number]  Encounter: [id number]    Assessment/Plan    Open wound  Continues making regular progress with the wound care. Wound itself is clean open and dry without signs of infection. Gently cleaned and repacked with dry gauze. Reviewed wound care instructions with plans for follow-up in a couple weeks for recheck. There are no diagnoses linked to this encounter. Subjective       Chief Complaint   Patient presents with   • Follow-up     wound check/ packing change/ po I&D pilonidal cyst  06/23/23      Patient came in today for a wound check/ packing change/ po I&D pilonidal cyst  06/23/23. Patient states the wound is getting smaller, very little drainage, denies pain or fevers/chills. Patient has no issues with doing the packing changes at home. Monalisa DOMINGO MA       Review of Systems   Skin: Positive for wound. The following portions of the patient's history were reviewed and updated as appropriate: allergies, current medications, past family history, past medical history, past social history, past surgical history and problem list.    Objective      Blood pressure 116/84, pulse 83, temperature 97.9 °F (36.6 °C), temperature source Temporal, resp. rate 18, height 5' 10" (1.778 m), weight 117 kg (259 lb), SpO2 98 %. Physical Exam  Vitals and nursing note reviewed. Constitutional:       General: He is not in acute distress. Appearance: He is well-developed. He is not diaphoretic. HENT:      Head: Normocephalic and atraumatic. Eyes:      Conjunctiva/sclera: Conjunctivae normal.      Pupils: Pupils are equal, round, and reactive to light. Pulmonary:      Effort: No respiratory distress. Musculoskeletal:         General: Normal range of motion. Cervical back: Normal range of motion. Skin:     General: Skin is warm and dry. Capillary Refill: Capillary refill takes less than 2 seconds.       Comments: Superficial open wound of the pilonidal region without evidence of infection. Neurological:      Mental Status: He is alert and oriented to person, place, and time.    Psychiatric:         Behavior: Behavior normal.         Signature:  Tomas Mills PA-C  Date: 8/4/2023 Time: 2:40 PM

## 2023-07-28 ENCOUNTER — OFFICE VISIT (OUTPATIENT)
Dept: SURGERY | Facility: CLINIC | Age: 38
End: 2023-07-28
Payer: COMMERCIAL

## 2023-07-28 VITALS — TEMPERATURE: 97.7 F

## 2023-07-28 DIAGNOSIS — T14.8XXA OPEN WOUND: Primary | ICD-10-CM

## 2023-07-28 PROCEDURE — 99212 OFFICE O/P EST SF 10 MIN: CPT | Performed by: PHYSICIAN ASSISTANT

## 2023-07-28 NOTE — PROGRESS NOTES
Progress Note - General Surgery   Keri Butts 44 y.o. male MRN: [id number]  Encounter: [id number]    Assessment/Plan    Open wound  Wound is now fully healed. No additional wound care required going forward. All questions answered, we will see back on a as needed basis. Diagnoses and all orders for this visit:    Open wound        Subjective       Chief Complaint   Patient presents with   • Follow-up     wound check/ packing change/ po I&D pilonidal cyst  06/23/23      Patient is here today for wound check/ packing change/ po I&D pilonidal cyst  06/23/23. Patient states the wound is much smaller and can barely get any packing in. Theres mild drainage but denies foul smell, pain or fevers/chills. Monalisa DOMINGO MA       Review of Systems   All other systems reviewed and are negative. The following portions of the patient's history were reviewed and updated as appropriate: allergies, current medications, past family history, past medical history, past social history, past surgical history and problem list.    Objective      Temperature 97.7 °F (36.5 °C), temperature source Temporal.   Physical Exam  Vitals and nursing note reviewed. Constitutional:       General: He is not in acute distress. Appearance: He is well-developed. He is not diaphoretic. HENT:      Head: Normocephalic and atraumatic. Eyes:      Conjunctiva/sclera: Conjunctivae normal.      Pupils: Pupils are equal, round, and reactive to light. Pulmonary:      Effort: No respiratory distress. Musculoskeletal:         General: Normal range of motion. Cervical back: Normal range of motion. Skin:     General: Skin is warm and dry. Capillary Refill: Capillary refill takes less than 2 seconds. Comments: Fully epithelialized scar to the pilonidal region no signs of infection. Neurological:      Mental Status: He is alert and oriented to person, place, and time.    Psychiatric:         Behavior: Behavior normal. Signature:  Tomas Mills PA-C  Date: 8/4/2023 Time: 1:58 PM

## 2023-08-04 NOTE — ASSESSMENT & PLAN NOTE
Wound is now fully healed. No additional wound care required going forward. All questions answered, we will see back on a as needed basis.

## 2024-05-22 ENCOUNTER — OFFICE VISIT (OUTPATIENT)
Dept: URGENT CARE | Facility: CLINIC | Age: 39
End: 2024-05-22
Payer: COMMERCIAL

## 2024-05-22 VITALS
OXYGEN SATURATION: 95 % | HEART RATE: 111 BPM | DIASTOLIC BLOOD PRESSURE: 78 MMHG | SYSTOLIC BLOOD PRESSURE: 160 MMHG | RESPIRATION RATE: 18 BRPM | TEMPERATURE: 97.4 F

## 2024-05-22 DIAGNOSIS — J02.9 SORE THROAT: ICD-10-CM

## 2024-05-22 DIAGNOSIS — R05.1 ACUTE COUGH: ICD-10-CM

## 2024-05-22 DIAGNOSIS — R68.89 FLU-LIKE SYMPTOMS: Primary | ICD-10-CM

## 2024-05-22 LAB — S PYO AG THROAT QL: NEGATIVE

## 2024-05-22 PROCEDURE — 87636 SARSCOV2 & INF A&B AMP PRB: CPT

## 2024-05-22 PROCEDURE — 99213 OFFICE O/P EST LOW 20 MIN: CPT

## 2024-05-22 PROCEDURE — 87880 STREP A ASSAY W/OPTIC: CPT

## 2024-05-22 NOTE — PROGRESS NOTES
[facility]        NAME: Chuck Dodd is a 38 y.o. male  : 1985    MRN: [id number]  DATE: May 22, 2024  TIME: 8:39 AM    Assessment and Plan   Flu-like symptoms [R68.89]  1. Flu-like symptoms        2. Sore throat  POCT rapid ANTIGEN strepA      3. Acute cough  Covid/Flu- Office Collect Normal        Rapid strep negative.  PCR COVID/flu obtained. Await results.     Patient Instructions     Check my chart for COVID/flu results.   Vitamin D3 2000 IU daily.  Vitamin C 1000mg twice per day.  Multivitamin daily.  Some studies suggest that Zinc 12.5-15mg every 2 hours while awake x 5 days may shorten the duration cold symptoms by 1-2 days.   Fluids and rest.  Nasal saline spray; Afrin if severe congestion (do not use for more than 3 days)  Over the counter cough/cold medications as needed.   Flonase nasal spray.  Tylenol/Ibuprofen for pain/fever.  Salt water gargles and/or chloraseptic spray.  Warm tea with honey.  Warm compresses over sinuses.  Nasal rinses with distilled water.     Follow up with PCP if symptoms persist past 10-14 days.  Proceed to the ER with worsening symptoms.     Chief Complaint     Chief Complaint   Patient presents with    Influenza     Cough, SOB, body aches, fever, sore throat, severe h/a. Recent travel to [place]. Taking tylenol.          History of Present Illness       The patient presents today with complaints of fever/chills (Tmax 101 at home), nasal congestion, dry non productive cough, SOB, swollen glands, headache, body aches that started yesterday. States the headache this morning was unbearable. He took tylenol/motrin combo with improvement in the head pain. Reports recent travel to [place]-returned on Friday. Denies history of asthma. He took a home COVID test which was negative but believes it may have been .     Sore Throat   This is a new problem. The current episode started yesterday. The problem has been gradually worsening. The pain is worse on the  right side. The maximum temperature recorded prior to his arrival was 100 - 100.9 F. The fever has been present for Less than 1 day. The pain is at a severity of 6/10. The pain is moderate. Associated symptoms include congestion, coughing, ear pain, headaches, neck pain, shortness of breath, swollen glands and trouble swallowing. Pertinent negatives include no abdominal pain, diarrhea or vomiting.       Review of Systems   Review of Systems   Constitutional:  Positive for chills and fever.   HENT:  Positive for congestion, ear pain, postnasal drip, rhinorrhea, sore throat and trouble swallowing. Negative for sinus pressure and sinus pain.    Respiratory:  Positive for cough and shortness of breath.    Gastrointestinal:  Negative for abdominal pain, diarrhea, nausea and vomiting.   Musculoskeletal:  Positive for neck pain.   Neurological:  Positive for headaches.         Current Medications       Current Outpatient Medications:     esomeprazole (NexIUM) 20 mg capsule, Take 20 mg by mouth every morning before breakfast, Disp: , Rfl:     Current Allergies     Allergies as of 05/22/2024    (No Known Allergies)            The following portions of the patient's history were reviewed and updated as appropriate: allergies, current medications, past family history, past medical history, past social history, past surgical history and problem list.     Past Medical History:   Diagnosis Date    ADHD     GERD (gastroesophageal reflux disease)        Past Surgical History:   Procedure Laterality Date    CHOLECYSTECTOMY      MI ESOPHAGOGASTRODUODENOSCOPY TRANSORAL DIAGNOSTIC N/A 4/18/2018    Procedure: ESOPHAGOGASTRODUODENOSCOPY (EGD);  Surgeon: Juanpablo Hoyt III, MD;  Location: [facility];  Service: Gastroenterology    MI EXCISION PILONIDAL CYST/SINUS COMPLICATED N/A 2/28/2023    Procedure: EXCISION PILONIDAL CYST;  Surgeon: Dexter Solis MD;  Location: [facility];  Service: General       Family History   Problem Relation  Age of Onset    No Known Problems Mother     Cancer Father     No Known Problems Brother     No Known Problems Daughter          Medications have been verified.        Objective   /78   Pulse (!) 111   Temp (!) 97.4 °F (36.3 °C)   Resp 18   SpO2 95%        Physical Exam     Physical Exam  Vitals and nursing note reviewed.   Constitutional:       General: He is not in acute distress.     Appearance: Normal appearance. He is not ill-appearing.   HENT:      Head: Normocephalic and atraumatic.      Right Ear: Tympanic membrane, ear canal and external ear normal.      Left Ear: Tympanic membrane, ear canal and external ear normal.      Nose: Congestion present. No rhinorrhea.      Mouth/Throat:      Lips: Pink.      Mouth: Mucous membranes are moist.      Pharynx: No oropharyngeal exudate or posterior oropharyngeal erythema.      Tonsils: No tonsillar exudate.   Eyes:      General: Vision grossly intact.      Extraocular Movements: Extraocular movements intact.      Pupils: Pupils are equal, round, and reactive to light.   Cardiovascular:      Rate and Rhythm: Regular rhythm. Tachycardia present.      Heart sounds: Normal heart sounds. No murmur heard.  Pulmonary:      Effort: Pulmonary effort is normal. No respiratory distress.      Breath sounds: Normal breath sounds. No decreased air movement. No decreased breath sounds, wheezing, rhonchi or rales.   Musculoskeletal:         General: Normal range of motion.      Cervical back: Normal range of motion.   Lymphadenopathy:      Cervical: No cervical adenopathy.   Skin:     General: Skin is warm.      Findings: No rash.   Neurological:      Mental Status: He is alert and oriented to person, place, and time.      Motor: Motor function is intact.      Gait: Gait is intact.   Psychiatric:         Attention and Perception: Attention normal.         Mood and Affect: Mood normal.

## 2024-05-23 ENCOUNTER — TELEPHONE (OUTPATIENT)
Dept: URGENT CARE | Facility: CLINIC | Age: 39
End: 2024-05-23

## 2024-05-23 LAB
FLUAV RNA RESP QL NAA+PROBE: NEGATIVE
FLUBV RNA RESP QL NAA+PROBE: NEGATIVE
SARS-COV-2 RNA RESP QL NAA+PROBE: POSITIVE

## 2024-05-23 NOTE — TELEPHONE ENCOUNTER
Called patient to inform patient of positive COVID results. Educated on self-isolation for 5 days followed by 5 additional days of mask wearing.

## 2024-05-27 ENCOUNTER — AMB VIDEO VISIT (OUTPATIENT)
Dept: OTHER | Facility: HOSPITAL | Age: 39
End: 2024-05-27

## 2024-05-27 DIAGNOSIS — L08.9 SKIN INFECTION: Primary | ICD-10-CM

## 2024-05-27 PROCEDURE — ECARE PR SL URGENT CARE VIRTUAL VISIT: Performed by: PHYSICIAN ASSISTANT

## 2024-05-27 RX ORDER — SULFAMETHOXAZOLE AND TRIMETHOPRIM 800; 160 MG/1; MG/1
1 TABLET ORAL EVERY 12 HOURS SCHEDULED
Qty: 20 TABLET | Refills: 0 | Status: SHIPPED | OUTPATIENT
Start: 2024-05-27 | End: 2024-06-06

## 2024-05-28 ENCOUNTER — AMB VIDEO VISIT (OUTPATIENT)
Dept: OTHER | Facility: HOSPITAL | Age: 39
End: 2024-05-28

## 2024-05-28 NOTE — PROGRESS NOTES
Required Documentation:  Encounter provider: Sunitha Estrella PA-C    Identify all parties in room with patient during virtual visit:  No one else    The patient was identified by name and date of birth. Chuck Dodd was informed that this is a telemedicine visit and that the visit is being conducted through the [employer] platform. He agrees to proceed..  My office door was closed. No one else was in the room.  He acknowledged consent and understanding of privacy and security of the video platform. The patient has agreed to participate and understands they can discontinue the visit at any time.    Verification of patient location:  Patient is located at Home in the following state in which I hold an active license [place]    Patient is aware this is a billable service.     Reason for visit is No chief complaint on file.       Subjective  HPI   Patient states that he had a cluster of pimples and squeezed it and now his head is infected.  It isn't draining.  He is covid positive and has some congestion.  He states that it hurts and swollen.      Past Medical History:   Diagnosis Date    ADHD     GERD (gastroesophageal reflux disease)        Past Surgical History:   Procedure Laterality Date    CHOLECYSTECTOMY      AL ESOPHAGOGASTRODUODENOSCOPY TRANSORAL DIAGNOSTIC N/A 4/18/2018    Procedure: ESOPHAGOGASTRODUODENOSCOPY (EGD);  Surgeon: Juanpablo Hoyt III, MD;  Location: [facility];  Service: Gastroenterology    AL EXCISION PILONIDAL CYST/SINUS COMPLICATED N/A 2/28/2023    Procedure: EXCISION PILONIDAL CYST;  Surgeon: Dexter Solis MD;  Location: [facility];  Service: General        No Known Allergies    Review of Systems   Constitutional: Negative.    HENT:  Positive for congestion.    Respiratory: Negative.     Cardiovascular: Negative.    Gastrointestinal: Negative.    Musculoskeletal: Negative.    Skin:  Positive for wound.   Neurological: Negative.    Psychiatric/Behavioral: Negative.         Video  Exam    There were no vitals filed for this visit.    Physical Exam  Constitutional:       General: He is not in acute distress.     Appearance: Normal appearance. He is not ill-appearing, toxic-appearing or diaphoretic.   HENT:      Head: Normocephalic and atraumatic.   Pulmonary:      Effort: Pulmonary effort is normal.   Skin:     Comments: Wound on forehead with surrounding erythema,  scabbing present, no active drainage   Neurological:      General: No focal deficit present.      Mental Status: He is alert and oriented to person, place, and time.   Psychiatric:         Mood and Affect: Mood normal.         Behavior: Behavior normal.         Visit Time  Total Visit Duration: 5 minutes    Assessment/Plan:    Diagnoses and all orders for this visit:    Skin infection  -     sulfamethoxazole-trimethoprim (BACTRIM DS) 800-160 mg per tablet; Take 1 tablet by mouth every 12 (twelve) hours for 10 days  -     mupirocin (BACTROBAN) 2 % ointment; Apply topically 3 (three) times a day        Patient Instructions   Start antibiotics as directed  Closely monitor  If no improvement in 24 hrs or if symptoms worsen go to ER

## 2024-05-28 NOTE — PATIENT INSTRUCTIONS
Start antibiotics as directed  Closely monitor  If no improvement in 24 hrs or if symptoms worsen go to ER

## 2024-06-10 NOTE — CARE ANYWHERE EVISITS
Visit Summary for RONALDO MYERS - Gender: Male - Date of Birth: 1985  Date: [id number] - Duration: 3 minutes  Patient: RONALDO MYERS  Provider: Sunitha Estrella PA-C    Patient Contact Information  Address  [address] [place]; [place] [address]  [phone]    Visit Topics    Triage Questions   What is your current physical address in the event of a medical emergency? Answer []  Are you allergic to any medications? Answer []  Are you now or could you be pregnant? Answer []  Do you have any immune system compromise or chronic lung   disease? Answer []  Do you have any vulnerable family members in the home (infant, pregnant, cancer, elderly)? Answer []     Conversation Transcripts  [0A][0A] [Notification] You are connected with Sunitha Estrella PA-C, Urgent Care Specialist.[0A][Notification] RONALDO MYERS is located in [place].[0A][Notification] RONALDO MYERS has shared health history...[0A]    Diagnosis  Local infection of the skin and subcutaneous tissue, unsp    Procedures  Value: [id number] Code: CPT-4 UNLISTED E&M SERVICE    Medications Prescribed    No prescriptions ordered    Electronically signed by: Sunitha Estrella PA-C(NPI [id number])

## 2025-03-10 NOTE — PROGRESS NOTES
Adult Annual Physical  Name: Chuck Dodd      : 1985      MRN: [id number]  Encounter Provider: Pipo Ordonez PA-C  Encounter Date: 3/11/2025   Encounter department: [facility]    Assessment & Plan  Annual physical exam         Elevated blood pressure reading without diagnosis of hypertension  BP today in office is 134/86  borderline uncontrolled  Pt educated on the importance of lifestyle modifications -ideally less than 1500 mg -limit fat and alcohol intake, increase fruit and vegetable consumption  -at least 30 minutes of physical activity a day   Recommend at-home BP monitoring to ensure control   Will follow-up in 4 weeks for recheck       Obesity (BMI 30-39.9)    Pt has attempted diet and lifestyle modifications without benefit, and they would like to start weight loss medication at this time  Pt denies Hx or Fhx of medullary thyroid carcinoma, Hx or Fhx of thyroid cancer of any kind, Hx of multiple endocrine neoplasia syndrome type 2 (MEN 2), or Hx of pancreatitis and understands the benefits of this medication as well as side effects prior to starting. Recommended alcohol prep pads for cleansing of the area prior to injection.   Will schedule 4-week follow-up for routine monitoring assuming the patient is able to get medication.  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  Anatomy of the ear is normal, there is no evidence of infection or structural abnormality  Will refer to audiology for comprehensive hearing exam  Orders:    Ambulatory Referral to Audiology; Future    Routine health maintenance    Orders:    Comprehensive metabolic panel; Future    CBC and differential; Future    Screening for lipid disorders    Orders:    Lipid panel;  "Future    Screening for diabetes mellitus (DM)    Orders:    Hemoglobin A1C; Future    Encounter for immunization    Orders:    TDAP VACCINE GREATER THAN OR EQUAL TO 6YO IM    Immunizations and preventive care screenings were discussed with patient today. Appropriate education was printed on patient's after visit summary.        Counseling:  Dental Health: discussed importance of regular tooth brushing, flossing, and dental visits.  Exercise: the importance of regular exercise/physical activity was discussed. Recommend exercise 3-5 times per week for at least 30 minutes.          History of Present Illness     Adult Annual Physical:  Patient presents for annual physical. Chuck Dodd is a 39 y.o. male  to re-establish care and is also due for annual.    vaccines, care gaps, screenings, routine labs, reviewed at this visit.  He would like Tdap vaccination, defers other vaccinations.    **Note: Portions of the record may have been created with voice recognition software.  Occasional wrong word or \"sound alike\" substitutions may have occurred due to the inherent limitations of voice recognition software.  Please read the chart carefully and recognize, using context, where substitutions have occurred. Please contact for further clarification, when necessary. .     Diet and Physical Activity:  - Diet/Nutrition: portion control.  - Exercise: no formal exercise.    Depression Screening:  - PHQ-2 Score: 0    General Health:  - Sleep: sleeps well.  - Hearing: decreased hearing left ear and decreased hearing right ear.  - Vision: no vision problems and goes for regular eye exams.  - Dental: no dental visits for > 1 year.    Review of Systems   Constitutional:  Negative for chills and fever.   HENT:  Positive for hearing loss. Negative for ear discharge, ear pain and sore throat.    Eyes:  Negative for pain and visual disturbance.   Respiratory:  Negative for cough and shortness of breath.    Cardiovascular:  Negative for " chest pain and palpitations.   Gastrointestinal:  Negative for abdominal pain and vomiting.   Genitourinary:  Negative for dysuria and hematuria.   Musculoskeletal:  Negative for arthralgias and back pain.   Skin:  Negative for color change and rash.   Neurological:  Negative for seizures and syncope.   All other systems reviewed and are negative.    Medical History Reviewed by provider this encounter:     .  Past Medical History   Past Medical History:   Diagnosis Date    ADHD     GERD (gastroesophageal reflux disease)      Past Surgical History:   Procedure Laterality Date    CHOLECYSTECTOMY      MT ESOPHAGOGASTRODUODENOSCOPY TRANSORAL DIAGNOSTIC N/A 4/18/2018    Procedure: ESOPHAGOGASTRODUODENOSCOPY (EGD);  Surgeon: Juanpablo Hoyt III, MD;  Location: [facility];  Service: Gastroenterology    MT EXCISION PILONIDAL CYST/SINUS COMPLICATED N/A 2/28/2023    Procedure: EXCISION PILONIDAL CYST;  Surgeon: Dexter Solis MD;  Location: [facility];  Service: General     Family History   Problem Relation Age of Onset    No Known Problems Mother     Cancer Father     No Known Problems Brother     No Known Problems Daughter       reports that he has quit smoking. His smoking use included cigarettes. He has never used smokeless tobacco. He reports current alcohol use. He reports that he does not currently use drugs after having used the following drugs: Marijuana.  Current Outpatient Medications   Medication Instructions    esomeprazole (NEXIUM) 20 mg, Oral, Every morning before breakfast    mupirocin (BACTROBAN) 2 % ointment Topical, 3 times daily   No Known Allergies   Current Outpatient Medications on File Prior to Visit   Medication Sig Dispense Refill    esomeprazole (NexIUM) 20 mg capsule Take 20 mg by mouth every morning before breakfast      mupirocin (BACTROBAN) 2 % ointment Apply topically 3 (three) times a day 60 g 0    [DISCONTINUED] diazepam (VALIUM) 5 mg tablet Take 1 tablet (5 mg total) by mouth 2 (two)  times a day for 10 doses 10 tablet 0     No current facility-administered medications on file prior to visit.      Social History     Tobacco Use    Smoking status: Former     Current packs/day: 0.25     Types: Cigarettes    Smokeless tobacco: Never   Vaping Use    Vaping status: Some Days    Substances: Nicotine   Substance and Sexual Activity    Alcohol use: Yes     Comment: 6 x year    Drug use: Not Currently     Types: Marijuana    Sexual activity: Not on file       Objective   There were no vitals taken for this visit.    Physical Exam  Vitals and nursing note reviewed.   Constitutional:       General: He is not in acute distress.     Appearance: He is well-developed.   HENT:      Head: Normocephalic and atraumatic.      Right Ear: Tympanic membrane normal.      Left Ear: Tympanic membrane normal.      Nose: Nose normal.      Mouth/Throat:      Pharynx: Oropharynx is clear.   Eyes:      Conjunctiva/sclera: Conjunctivae normal.   Cardiovascular:      Rate and Rhythm: Normal rate and regular rhythm.      Heart sounds: No murmur heard.  Pulmonary:      Effort: Pulmonary effort is normal. No respiratory distress.      Breath sounds: Normal breath sounds.   Abdominal:      Palpations: Abdomen is soft.      Tenderness: There is no abdominal tenderness.   Musculoskeletal:         General: No swelling.      Cervical back: Neck supple.   Skin:     General: Skin is warm and dry.   Neurological:      Mental Status: He is alert and oriented to person, place, and time.   Psychiatric:         Mood and Affect: Mood normal.

## 2025-03-11 ENCOUNTER — TELEPHONE (OUTPATIENT)
Dept: FAMILY MEDICINE CLINIC | Facility: CLINIC | Age: 40
End: 2025-03-11

## 2025-03-11 ENCOUNTER — OFFICE VISIT (OUTPATIENT)
Dept: FAMILY MEDICINE CLINIC | Facility: CLINIC | Age: 40
End: 2025-03-11
Payer: COMMERCIAL

## 2025-03-11 VITALS
SYSTOLIC BLOOD PRESSURE: 134 MMHG | DIASTOLIC BLOOD PRESSURE: 86 MMHG | HEART RATE: 73 BPM | BODY MASS INDEX: 37.51 KG/M2 | TEMPERATURE: 98 F | WEIGHT: 262 LBS | HEIGHT: 70 IN | OXYGEN SATURATION: 97 %

## 2025-03-11 DIAGNOSIS — Z00.00 ANNUAL PHYSICAL EXAM: Primary | ICD-10-CM

## 2025-03-11 DIAGNOSIS — E66.9 OBESITY (BMI 30-39.9): ICD-10-CM

## 2025-03-11 DIAGNOSIS — H91.93 DECREASED HEARING OF BOTH EARS: ICD-10-CM

## 2025-03-11 DIAGNOSIS — Z00.00 ROUTINE HEALTH MAINTENANCE: ICD-10-CM

## 2025-03-11 DIAGNOSIS — Z23 ENCOUNTER FOR IMMUNIZATION: ICD-10-CM

## 2025-03-11 DIAGNOSIS — R03.0 ELEVATED BLOOD PRESSURE READING WITHOUT DIAGNOSIS OF HYPERTENSION: ICD-10-CM

## 2025-03-11 DIAGNOSIS — Z13.220 SCREENING FOR LIPID DISORDERS: ICD-10-CM

## 2025-03-11 DIAGNOSIS — Z13.1 SCREENING FOR DIABETES MELLITUS (DM): ICD-10-CM

## 2025-03-11 PROBLEM — L05.01 PILONIDAL ABSCESS: Status: RESOLVED | Noted: 2023-06-23 | Resolved: 2025-03-11

## 2025-03-11 PROBLEM — T14.8XXA OPEN WOUND: Status: RESOLVED | Noted: 2022-11-07 | Resolved: 2025-03-11

## 2025-03-11 PROBLEM — F90.9 ADHD (ATTENTION DEFICIT HYPERACTIVITY DISORDER): Status: RESOLVED | Noted: 2017-11-08 | Resolved: 2025-03-11

## 2025-03-11 PROCEDURE — 90715 TDAP VACCINE 7 YRS/> IM: CPT

## 2025-03-11 PROCEDURE — 90471 IMMUNIZATION ADMIN: CPT

## 2025-03-11 PROCEDURE — 99396 PREV VISIT EST AGE 40-64: CPT

## 2025-03-11 PROCEDURE — 99214 OFFICE O/P EST MOD 30 MIN: CPT

## 2025-03-11 NOTE — TELEPHONE ENCOUNTER
PA for WEGOVY) 0.25 MG/0.5ML  EXCLUDED from plan       Reason:(Screenshot if applicable)        Message sent to office clinical pool Yes    Appeal is not warranted unless the following is met:    DX of DANE and the below cardiac events:   You have established cardiovascular disease as evidenced by one of the following:    (1) Prior myocardial infarction    (2) Prior ischemic or hemorrhagic stroke    (3) Symptomatic peripheral arterial disease evidenced by one of the following:     (A) Intermittent claudication with ankle-brachial index less than 0.85 (at rest)     (B) Peripheral arterial revascularization procedure     (C) Amputation due to atherosclerotic disease

## 2025-03-11 NOTE — ASSESSMENT & PLAN NOTE
Pt has attempted diet and lifestyle modifications without benefit, and they would like to start weight loss medication at this time  Pt denies Hx or Fhx of medullary thyroid carcinoma, Hx or Fhx of thyroid cancer of any kind, Hx of multiple endocrine neoplasia syndrome type 2 (MEN 2), or Hx of pancreatitis and understands the benefits of this medication as well as side effects prior to starting. Recommended alcohol prep pads for cleansing of the area prior to injection.   Will schedule 4-week follow-up for routine monitoring assuming the patient is able to get medication.  Orders:    Semaglutide-Weight Management (WEGOVY) 0.25 MG/0.5ML; Inject 0.5 mL (0.25 mg total) under the skin once a week

## 2025-03-11 NOTE — TELEPHONE ENCOUNTER
PA for WEGOVY) 0.25 MG/0.5ML SUBMITTED to     via    [x]CMM-KEY: [id number]  []Surescripts-Case ID #   []Availity-Auth ID # NDC #   []Faxed to plan   []Other website   []Phone call Case ID #     [x]PA sent as URGENT    All office notes, labs and other pertaining documents and studies sent. Clinical questions answered. Awaiting determination from insurance company.     Turnaround time for your insurance to make a decision on your Prior Authorization can take 7-21 business days.

## 2025-03-12 ENCOUNTER — APPOINTMENT (OUTPATIENT)
Dept: LAB | Facility: CLINIC | Age: 40
End: 2025-03-12
Payer: COMMERCIAL

## 2025-03-12 DIAGNOSIS — Z00.00 ROUTINE HEALTH MAINTENANCE: ICD-10-CM

## 2025-03-12 DIAGNOSIS — Z13.1 SCREENING FOR DIABETES MELLITUS (DM): ICD-10-CM

## 2025-03-12 DIAGNOSIS — Z13.220 SCREENING FOR LIPID DISORDERS: ICD-10-CM

## 2025-03-12 LAB
BASOPHILS # BLD AUTO: 0.08 THOUSANDS/ÂΜL (ref 0–0.1)
BASOPHILS NFR BLD AUTO: 1 % (ref 0–1)
EOSINOPHIL # BLD AUTO: 0.17 THOUSAND/ÂΜL (ref 0–0.61)
EOSINOPHIL NFR BLD AUTO: 2 % (ref 0–6)
ERYTHROCYTE [DISTWIDTH] IN BLOOD BY AUTOMATED COUNT: 13.6 % (ref 11.6–15.1)
HCT VFR BLD AUTO: 50.2 % (ref 36.5–49.3)
HGB BLD-MCNC: 16 G/DL (ref 12–17)
IMM GRANULOCYTES # BLD AUTO: 0.07 THOUSAND/UL (ref 0–0.2)
IMM GRANULOCYTES NFR BLD AUTO: 1 % (ref 0–2)
LYMPHOCYTES # BLD AUTO: 2.51 THOUSANDS/ÂΜL (ref 0.6–4.47)
LYMPHOCYTES NFR BLD AUTO: 26 % (ref 14–44)
MCH RBC QN AUTO: 28.6 PG (ref 26.8–34.3)
MCHC RBC AUTO-ENTMCNC: 31.9 G/DL (ref 31.4–37.4)
MCV RBC AUTO: 90 FL (ref 82–98)
MONOCYTES # BLD AUTO: 0.8 THOUSAND/ÂΜL (ref 0.17–1.22)
MONOCYTES NFR BLD AUTO: 8 % (ref 4–12)
NEUTROPHILS # BLD AUTO: 5.9 THOUSANDS/ÂΜL (ref 1.85–7.62)
NEUTS SEG NFR BLD AUTO: 62 % (ref 43–75)
NRBC BLD AUTO-RTO: 0 /100 WBCS
PLATELET # BLD AUTO: 281 THOUSANDS/UL (ref 149–390)
PMV BLD AUTO: 10.6 FL (ref 8.9–12.7)
RBC # BLD AUTO: 5.59 MILLION/UL (ref 3.88–5.62)
WBC # BLD AUTO: 9.53 THOUSAND/UL (ref 4.31–10.16)

## 2025-03-12 PROCEDURE — 80061 LIPID PANEL: CPT

## 2025-03-12 PROCEDURE — 36415 COLL VENOUS BLD VENIPUNCTURE: CPT

## 2025-03-12 PROCEDURE — 85025 COMPLETE CBC W/AUTO DIFF WBC: CPT

## 2025-03-12 PROCEDURE — 83036 HEMOGLOBIN GLYCOSYLATED A1C: CPT

## 2025-03-12 PROCEDURE — 80053 COMPREHEN METABOLIC PANEL: CPT

## 2025-03-12 NOTE — TELEPHONE ENCOUNTER
3/12/25 10:27 AM  Hello,     I thought about it. Would it be possible to do a sleep study. I do snore really bad so maybe that is something that will help.

## 2025-03-12 NOTE — TELEPHONE ENCOUNTER
Patient notified, he would like to wait and see what his blood work shows and discuss with provider at his upcoming appointment

## 2025-03-12 NOTE — TELEPHONE ENCOUNTER
Wegovy denied by insurance. I can place weight management referral and/or refer pt to nutritionist if he would like to explore other options for weight loss

## 2025-03-13 DIAGNOSIS — E66.9 OBESITY (BMI 30-39.9): Primary | ICD-10-CM

## 2025-03-13 DIAGNOSIS — R06.83 SNORING: Primary | ICD-10-CM

## 2025-03-13 LAB
ALBUMIN SERPL BCG-MCNC: 4.9 G/DL (ref 3.5–5)
ALP SERPL-CCNC: 75 U/L (ref 34–104)
ALT SERPL W P-5'-P-CCNC: 49 U/L (ref 7–52)
ANION GAP SERPL CALCULATED.3IONS-SCNC: 17 MMOL/L (ref 4–13)
AST SERPL W P-5'-P-CCNC: 28 U/L (ref 13–39)
BILIRUB SERPL-MCNC: 0.76 MG/DL (ref 0.2–1)
BUN SERPL-MCNC: 13 MG/DL (ref 5–25)
CALCIUM SERPL-MCNC: 9.8 MG/DL (ref 8.4–10.2)
CHLORIDE SERPL-SCNC: 105 MMOL/L (ref 96–108)
CHOLEST SERPL-MCNC: 154 MG/DL (ref ?–200)
CO2 SERPL-SCNC: 23 MMOL/L (ref 21–32)
CREAT SERPL-MCNC: 0.97 MG/DL (ref 0.6–1.3)
EST. AVERAGE GLUCOSE BLD GHB EST-MCNC: 117 MG/DL
GFR SERPL CREATININE-BSD FRML MDRD: 97 ML/MIN/1.73SQ M
GLUCOSE P FAST SERPL-MCNC: 101 MG/DL (ref 65–99)
HBA1C MFR BLD: 5.7 %
HDLC SERPL-MCNC: 35 MG/DL
LDLC SERPL CALC-MCNC: 86 MG/DL (ref 0–100)
NONHDLC SERPL-MCNC: 119 MG/DL
POTASSIUM SERPL-SCNC: 4.3 MMOL/L (ref 3.5–5.3)
PROT SERPL-MCNC: 8.1 G/DL (ref 6.4–8.4)
SODIUM SERPL-SCNC: 145 MMOL/L (ref 135–147)
TRIGL SERPL-MCNC: 165 MG/DL (ref ?–150)

## 2025-03-27 ENCOUNTER — OFFICE VISIT (OUTPATIENT)
Dept: FAMILY MEDICINE CLINIC | Facility: CLINIC | Age: 40
End: 2025-03-27
Payer: COMMERCIAL

## 2025-03-27 ENCOUNTER — HOSPITAL ENCOUNTER (OUTPATIENT)
Dept: RADIOLOGY | Facility: MEDICAL CENTER | Age: 40
Discharge: HOME | End: 2025-03-27
Payer: COMMERCIAL

## 2025-03-27 ENCOUNTER — TELEPHONE (OUTPATIENT)
Age: 40
End: 2025-03-27

## 2025-03-27 VITALS
DIASTOLIC BLOOD PRESSURE: 94 MMHG | BODY MASS INDEX: 37.51 KG/M2 | SYSTOLIC BLOOD PRESSURE: 140 MMHG | WEIGHT: 262 LBS | HEIGHT: 70 IN | TEMPERATURE: 98.4 F | OXYGEN SATURATION: 98 % | HEART RATE: 78 BPM

## 2025-03-27 DIAGNOSIS — R31.29 MICROSCOPIC HEMATURIA: Primary | ICD-10-CM

## 2025-03-27 DIAGNOSIS — R10.9 RIGHT FLANK DISCOMFORT: ICD-10-CM

## 2025-03-27 DIAGNOSIS — I10 PRIMARY HYPERTENSION: ICD-10-CM

## 2025-03-27 DIAGNOSIS — R10.9 RIGHT FLANK DISCOMFORT: Primary | ICD-10-CM

## 2025-03-27 LAB
SL AMB  POCT GLUCOSE, UA: ABNORMAL
SL AMB LEUKOCYTE ESTERASE,UA: ABNORMAL
SL AMB POCT BILIRUBIN,UA: ABNORMAL
SL AMB POCT BLOOD,UA: 10
SL AMB POCT CLARITY,UA: CLEAR
SL AMB POCT COLOR,UA: YELLOW
SL AMB POCT KETONES,UA: ABNORMAL
SL AMB POCT NITRITE,UA: ABNORMAL
SL AMB POCT PH,UA: 6.5
SL AMB POCT SPECIFIC GRAVITY,UA: 1.01
SL AMB POCT URINE PROTEIN: ABNORMAL
SL AMB POCT UROBILINOGEN: 0.2

## 2025-03-27 PROCEDURE — 74176 CT ABD & PELVIS W/O CONTRAST: CPT

## 2025-03-27 PROCEDURE — 81003 URINALYSIS AUTO W/O SCOPE: CPT

## 2025-03-27 PROCEDURE — 99214 OFFICE O/P EST MOD 30 MIN: CPT

## 2025-03-27 RX ORDER — AMLODIPINE AND BENAZEPRIL HYDROCHLORIDE 5; 20 MG/1; MG/1
1 CAPSULE ORAL DAILY
Qty: 20 CAPSULE | Refills: 0 | Status: SHIPPED | OUTPATIENT
Start: 2025-03-27

## 2025-03-27 NOTE — PROGRESS NOTES
"Name: Chuck Dodd      : 1985      MRN: [id number]  Encounter Provider: Pipo Ordonez PA-C  Encounter Date: 3/27/2025   Encounter department: [facility]    Assessment & Plan  Right flank discomfort  Pain located R flank for 2 week(s)  Described as discomfort, and is worsened by twisting motions.   Pt has attempted to alleviate the pain with otc analgesics which has caused some relief.   does not have a Hx of trauma or injury related to current sx.  Current pain is rated as a 5/10 pain.  Pertinent negatives at this time include no difficulty urinating, pain with urination, no rashes  Examination reveals marked CVA tenderness of the right flank area  Urine testing ordered in office showing trace blood  Clinical suspicion for nephrolithiasis,  discussed risk and benefit of ultrasound versus CT, patient agreeable for stat CT scan ordered  Advised patient if developing worsening flank pain chest pain shortness of breath or difficulty breathing to present to the ED and he is in agreement    Orders:    CT renal stone study abdomen pelvis wo contrast; Future    POCT urine dip auto non-scope    Primary hypertension  BP today in office is 140/94 Not well controlled    Recommend at-home BP monitoring to ensure control   start Lotrel 5-20 follow-up in 2 weeks  Orders:    amLODIPine-benazepril (LOTREL 5-20) 5-20 MG per capsule; Take 1 capsule by mouth daily         History of Present Illness     Chuck Dodd is a 39 y.o. male  presenting for progressive flank pain        **Note: Portions of the record may have been created with voice recognition software.  Occasional wrong word or \"sound alike\" substitutions may have occurred due to the inherent limitations of voice recognition software.  Please read the chart carefully and recognize, using context, where substitutions have occurred. Please contact for further clarification, when necessary.         Review of Systems   Constitutional:  " Negative for chills and fever.   HENT:  Negative for ear pain and sore throat.    Eyes:  Negative for pain and visual disturbance.   Respiratory:  Negative for cough and shortness of breath.    Cardiovascular:  Negative for chest pain and palpitations.   Gastrointestinal:  Negative for abdominal distention, abdominal pain, anal bleeding, blood in stool, constipation, diarrhea, nausea, rectal pain and vomiting.   Genitourinary:  Positive for flank pain (Right). Negative for difficulty urinating, dysuria, enuresis, frequency and hematuria.   Musculoskeletal:  Negative for arthralgias, back pain, myalgias and neck pain.   Skin:  Negative for color change and rash.   Neurological:  Negative for seizures and syncope.   All other systems reviewed and are negative.    Past Medical History:   Diagnosis Date    ADHD     GERD (gastroesophageal reflux disease)     Headache(784.0)      Past Surgical History:   Procedure Laterality Date    CHOLECYSTECTOMY      [place] ESOPHAGOGASTRODUODENOSCOPY TRANSORAL DIAGNOSTIC N/A 4/18/2018    Procedure: ESOPHAGOGASTRODUODENOSCOPY (EGD);  Surgeon: Juanpablo Hoyt III, MD;  Location: [facility];  Service: Gastroenterology    [place] EXCISION PILONIDAL CYST/SINUS COMPLICATED N/A 2/28/2023    Procedure: EXCISION PILONIDAL CYST;  Surgeon: Dexter Solis MD;  Location: [facility];  Service: General     Family History   Problem Relation Age of Onset    No Known Problems Mother     Cancer Father     COPD Father     No Known Problems Brother     No Known Problems Daughter      Social History     Tobacco Use    Smoking status: Former     Current packs/day: 0.25     Types: Cigarettes    Smokeless tobacco: Never   Vaping Use    Vaping status: Some Days    Substances: Nicotine   Substance and Sexual Activity    Alcohol use: Yes     Comment: 6 x year    Drug use: Not Currently     Types: Marijuana    Sexual activity: Yes     Partners: Female     Current Outpatient Medications on File Prior to Visit  "  Medication Sig    [DISCONTINUED] diazepam (VALIUM) 5 mg tablet Take 1 tablet (5 mg total) by mouth 2 (two) times a day for 10 doses    [DISCONTINUED] Semaglutide-Weight Management (WEGOVY) 0.25 MG/0.5ML Inject 0.5 mL (0.25 mg total) under the skin once a week     No Known Allergies  Immunization History   Administered Date(s) Administered    COVID-19 [employer] ([employer]) vaccine 0.5 mL 05/05/2021    Tdap 03/11/2025     Objective   /94   Pulse 78   Temp 98.4 °F (36.9 °C)   Ht 5' 10\" (1.778 m)   Wt 119 kg (262 lb)   SpO2 98%   BMI 37.59 kg/m²     Physical Exam  Vitals and nursing note reviewed.   Constitutional:       General: He is not in acute distress.     Appearance: He is well-developed.   HENT:      Head: Normocephalic and atraumatic.      Nose: Nose normal.   Eyes:      Conjunctiva/sclera: Conjunctivae normal.   Cardiovascular:      Rate and Rhythm: Normal rate and regular rhythm.      Heart sounds: No murmur heard.  Pulmonary:      Effort: Pulmonary effort is normal. No respiratory distress.      Breath sounds: Normal breath sounds. No wheezing.   Abdominal:      General: There is no distension.      Palpations: Abdomen is soft.      Tenderness: There is no abdominal tenderness. There is right CVA tenderness. There is no left CVA tenderness.   Musculoskeletal:         General: No swelling.      Cervical back: Neck supple.   Skin:     General: Skin is warm and dry.      Capillary Refill: Capillary refill takes less than 2 seconds.   Neurological:      Mental Status: He is alert and oriented to person, place, and time.   Psychiatric:         Mood and Affect: Mood normal.         "

## 2025-03-27 NOTE — ASSESSMENT & PLAN NOTE
BP today in office is 140/94 Not well controlled    Recommend at-home BP monitoring to ensure control   start Lotrel 5-20 follow-up in 2 weeks  Orders:    amLODIPine-benazepril (LOTREL 5-20) 5-20 MG per capsule; Take 1 capsule by mouth daily

## 2025-03-27 NOTE — TELEPHONE ENCOUNTER
Phone call from patient stating he saw his ct scan result and is asking for the provider to review result. Patient asking what his next step would be if the ct scan shows nothing and he still is having pain.  Please advise. Thank you.

## 2025-04-03 ENCOUNTER — TELEPHONE (OUTPATIENT)
Age: 40
End: 2025-04-03

## 2025-04-03 ENCOUNTER — OFFICE VISIT (OUTPATIENT)
Dept: BARIATRICS | Facility: CLINIC | Age: 40
End: 2025-04-03
Payer: COMMERCIAL

## 2025-04-03 VITALS
OXYGEN SATURATION: 98 % | HEIGHT: 70 IN | WEIGHT: 257 LBS | DIASTOLIC BLOOD PRESSURE: 78 MMHG | SYSTOLIC BLOOD PRESSURE: 120 MMHG | HEART RATE: 81 BPM | BODY MASS INDEX: 36.79 KG/M2

## 2025-04-03 DIAGNOSIS — E78.1 HYPERTRIGLYCERIDEMIA: ICD-10-CM

## 2025-04-03 DIAGNOSIS — E66.812 OBESITY, CLASS II, BMI 35-39.9: Primary | ICD-10-CM

## 2025-04-03 DIAGNOSIS — R73.03 PREDIABETES: ICD-10-CM

## 2025-04-03 DIAGNOSIS — E66.9 OBESITY (BMI 30-39.9): ICD-10-CM

## 2025-04-03 DIAGNOSIS — I10 PRIMARY HYPERTENSION: ICD-10-CM

## 2025-04-03 PROCEDURE — 99204 OFFICE O/P NEW MOD 45 MIN: CPT | Performed by: PHYSICAL THERAPIST

## 2025-04-03 RX ORDER — TIRZEPATIDE 2.5 MG/.5ML
2.5 INJECTION, SOLUTION SUBCUTANEOUS WEEKLY
Qty: 2 ML | Refills: 0 | Status: SHIPPED | OUTPATIENT
Start: 2025-04-03 | End: 2025-05-01

## 2025-04-03 NOTE — PROGRESS NOTES
Assessment/Plan:    Obesity, Class II, BMI 35-39.9  - Discussed options of Healthy[facility]-Intensive Lifestyle Intervention Program, Very Low Calorie Diet-VLCD, and Conservative Program and the role of weight loss medications.  - Patient is interested in pursuing Conservative Program and follow up visits with medical weight management provider.  - Explained the importance of making lifestyle changes in addition to starting any anti-obesity medications.   - Initial weight loss goal of 5-10% weight loss for improved health. Weight loss can improve patient's co-morbid conditions and/or prevent weight-related complications.  - Weight is not at goal and patient has been unable to achieve a meaningful weight loss above 5% using various programs and tools for more than 6 months  - Labs reviewed.     General Recommendations:  Nutrition:  Eat breakfast daily.  Do not skip meals.      Food log (ie.) www.[employer].com, sparkpeople.com, loseit.com, calorieking.com, etc.     Practice mindful eating.  Be sure to set aside time to eat, eat slowly, and savor your food.     Hydration:    At least 64oz of water daily.  No sugar sweetened beverages.  No juice (eat the fruit instead).     Exercise:  Studies have shown that the ideal exercise goal is somewhere between 150 to 300 minutes of moderate intensity exercise a week.  Start with exercising 10 minutes every other day and gradually increase physical activity with a goal of at least 150 minutes of moderate intensity exercise a week, divided over at least 3 days a week.  An example of this would be exercising 30 minutes a day, 5 days a week.  Resistance training can increase muscle mass and increase our resting metabolic rate.   FULL BODY resistance training is recommended 2-3 times a week.  Do not do this on consecutive days to allow for muscle recovery.     Aim for a bare minimum 5000 steps, even on days you do not exercise.     Monitoring:   Weigh yourself daily.  If this causes  undue stress, then just weigh yourself once a week.  Weigh yourself the same time of the day with the same amount of clothing on.  Preferably this should be done after waking up, before you eat, and with no clothing or minimal clothing on.     Specific Goals:  Calorie goal:  1700 tracey/day (Provided with meal plan to follow)    Zepbound Instructions:    - Begin Zepbound 2.5 mg subcutaneously once a week. Dose changes may occur after 4 doses if medication is tolerated. You will be assessed prior to each dose change to make sure you are tolerating the medication well.  - Please message me when you have 2 pens left from the prescription so there are no lapses in treatment.  - If you have been off the medication for more than 14 days please contact the office as you will need to restart the titration at the starting dose again to avoid significant side effects or adverse events.  - Visit [email or web address].com for further information/injection instructions.   -Please eat small frequent meals to help reduce nausea. Lemon water and saltine crackers may help with this.   - Side effects of Zepbound discussed: nausea, vomiting, diarrhea, and constipation. If you experience fever, nausea/vomiting, and pain radiating to your back this may be a sign of pancreatitis. Please go to the emergency room if this occurs.  - If on oral birth control a 2nd method of birth control is recommended during the 1st 8 weeks of therapy and for 4 weeks after any dosage change.   - Patient understands the side effects of the medication and proper administration. Patient agrees with the treatment plan and all questions were answered.    - Side effects of Zepbound: nausea, vomiting, diarrhea, and constipation. If severe abdominal pain develops, stop Zepbound and go to the ER, as this could be pancreatitis. Monitor heart rate while on Zepbound and if resting heart rate greater than 100 beats per minute, patient will notify me.   - If you need to have surgery or  another procedure, such as an upper endoscopy or colonoscopy, please contact my office as often medications like Zepbound need to be held for a certain amount of time prior to a procedure.       GLP-1 Managing Side Effects Tips:  - focus on small frequent meals  - moderate sugar and fat intake  - change the injection site (abdomen --> thigh--> back of arm)  - eat protein, crackers, mints and olga-based drinks  - nighttime injections  - drink plenty of water  - consider fiber supplements like miralax     Tips for Nausea:  - Don't drink and eat simultaneously: separate fluids 30-60 minutes before and after meals when experiencing nausea or if you notice you become full quickly  - Choose mild smelling foods- strong smells can exacerbate nausea  - Olga and peppermint- consider drinking olga or peppermint tea, which can help alleviate symptoms  - Eat- don't skip meals, if you have nausea, it is understandable that you may not feel like eating. However, skipping meals is generally not recommended as this can lead to lower blood sugar and fatigue. Furthermore, it is essential to consume adequate protein during weight loss. You can opt for a protein shake, a meal replacement supplement, bone broth or soup.      Tips for Constipation:   - Drink plenty of water  - Eat food with a high fiber content: increase your fiber intake by consuming these types of foods:              Eat more whole grain products like barley, oats, farro, brown or wild rice and quinoa.               Look for choices with 100% whole wheat, rye, oats or bran as the first ingredient listed               Check nutrition fact labels and choose products with 4gm of dietary fiber or more per serving              Add more beans to your diet              Eat more fresh fruits and vegetables with skins on them               Exercise- increase physical activity as it helps stimulate gastric mobility, which moves stool through the digestive tract     Patient  denies personal history of pancreatitis. Patient also denies personal and family history of medullary thyroid cancer and multiple endocrine neoplasia type 2 (MEN 2 tumor). Increased risk for the aforementioned disease processes as well as those included in the medication monograph, associated with taking GLP-1 medication discussed. Patient acknowledges and would like to proceed with treatment. Patient also denies pregnancy. Expresses understanding that medication is not safe in pregnancy and will discontinue in the event of pregnancy. Patient demonstrates full understanding verbally. All questions answered.       Patient understands the side effects of the medication and proper administration. Patient agrees with the treatment plan and all questions were answered.           Chuck was seen today for consult.    Diagnoses and all orders for this visit:    Obesity, Class II, BMI 35-39.9  -     tirzepatide (Zepbound) 2.5 mg/0.5 mL auto-injector; Inject 0.5 mL (2.5 mg total) under the skin once a week for 28 days    Obesity (BMI 30-39.9)  -     Ambulatory Referral to Weight Management  -     tirzepatide (Zepbound) 2.5 mg/0.5 mL auto-injector; Inject 0.5 mL (2.5 mg total) under the skin once a week for 28 days    Primary hypertension  -     tirzepatide (Zepbound) 2.5 mg/0.5 mL auto-injector; Inject 0.5 mL (2.5 mg total) under the skin once a week for 28 days    Hypertriglyceridemia  -     tirzepatide (Zepbound) 2.5 mg/0.5 mL auto-injector; Inject 0.5 mL (2.5 mg total) under the skin once a week for 28 days    Prediabetes  -     tirzepatide (Zepbound) 2.5 mg/0.5 mL auto-injector; Inject 0.5 mL (2.5 mg total) under the skin once a week for 28 days           Total time spent reviewing chart, interviewing patient, examining patient, discussing plan, answering all questions, and documentin min, with >50% face-to-face time spent counseling patient on nonsurgical interventions for the treatment of excess weight. Discussed  in detail nonsurgical options including intensive lifestyle intervention program, very low-calorie diet program and conservative program.  Discussed the role of weight loss medications.  Counseled patient on diet behavior and exercise modification for weight loss.    Follow up in approximately 3 months with Non-Surgical Physician/Advanced Practitioner.    Subjective:   Chief Complaint   Patient presents with    Consult     Waist  Neck  SB       Patient ID: Chuck Dodd  is a 39 y.o. male with excess weight/obesity here to pursue weight management.  Previous notes and records have been reviewed. Patient's PCP prescribed Wegovy. Denied by insurance. Zepbound appears to be covered with some exclusions. Patient has appointment with sleep medicine in July, likely has sleep apnea.     Past Medical History:   Diagnosis Date    ADHD     GERD (gastroesophageal reflux disease)     Headache(784.0)      Past Surgical History:   Procedure Laterality Date    CHOLECYSTECTOMY      DE ESOPHAGOGASTRODUODENOSCOPY TRANSORAL DIAGNOSTIC N/A 4/18/2018    Procedure: ESOPHAGOGASTRODUODENOSCOPY (EGD);  Surgeon: Juanpablo Hoyt III, MD;  Location: [facility];  Service: Gastroenterology    DE EXCISION PILONIDAL CYST/SINUS COMPLICATED N/A 2/28/2023    Procedure: EXCISION PILONIDAL CYST;  Surgeon: Dexter Solis MD;  Location: [facility];  Service: General       HPI:  Wt Readings from Last 20 Encounters:   04/03/25 117 kg (257 lb)   03/27/25 119 kg (262 lb)   03/11/25 119 kg (262 lb)   07/14/23 117 kg (259 lb)   04/07/23 119 kg (262 lb)   02/28/23 118 kg (260 lb)   02/17/23 118 kg (260 lb 6 oz)   12/09/22 118 kg (261 lb)   11/07/22 119 kg (263 lb)   11/03/22 119 kg (263 lb 4 oz)   08/24/22 120 kg (265 lb)   08/23/22 120 kg (264 lb)   12/28/18 104 kg (230 lb)   11/25/18 108 kg (238 lb)   10/17/18 104 kg (230 lb)   07/19/18 98.9 kg (218 lb)   04/18/18 103 kg (227 lb 11.8 oz)   04/10/18 107 kg (235 lb 8 oz)   11/08/17 112 kg (246 lb 7 oz)        Patient presents today to medical weight management office for consult.      Starting MWM weight: 257 lbs (4/3/2025)  Starting BMI: 36.88 (4/3/2025)  Goal weight: healthy BMI; 215 lbs       Obesity/Excess Weight:  Severity: Severe  Onset:  since he was 24    Modifiers: Diet and Exercise  Contributing factors: Poor Food Choices, Insufficient Physical Activity, and Insufficient time to make appropriate lifestyle changes  Associated symptoms: comorbid conditions, fatigue, decreased exercise capacity, body image issues, inability to do certain activities, and clothes do not fit      Pertinent PMHx: HTN      Diet recall:  B: usually skips; sometimes BLT's on a bagel; previously doing premier protein shakes   L: usually skips  D: seafood, chicken, steak   S: candy bars, sometimes honey-roasted peanuts    Take out frequency: 1-2x weekly     Hydration: 3-4 bottles of water; sweetened iced tea and lemonade   Alcohol: rarely  Smoking: vaping  Exercise: no formal exercise  Occupation: works in construction  Sleep: 7-8 hrs  STOP ban/8    Colonoscopy: N/A    The following portions of the patient's history were reviewed and updated as appropriate: allergies, current medications, past family history, past medical history, past social history, past surgical history, and problem list.    Patient denies personal history of pancreatitis. Patient also denies personal and family history of medullary thyroid cancer and multiple endocrine neoplasia type 2 (MEN 2 tumor). Increased risk for the aforementioned disease processes as well as those included in the medication monograph, associated with taking GLP-1 medication discussed. Patient acknowledges and would like to proceed with treatment. Patient also denies pregnancy. Expresses understanding that medication is not safe in pregnancy and will discontinue in the event of pregnancy. Patient demonstrates full understanding verbally. All questions answered.       Patient  "understands that it can take between 7-21 BUSINESS DAYS for a prior authorization to be initiated by the office and an additional 7-21 BUSINESS DAYS for the insurance company to provide a decision. If the medication is denied by an insurance plan exclusion, the office WILL NOT appeal the medication.       Family History   Problem Relation Age of Onset    No Known Problems Mother     Cancer Father     COPD Father     No Known Problems Brother     No Known Problems Daughter         Review of Systems   Constitutional:  Negative for chills and fever.   HENT:  Negative for ear pain and sore throat.    Eyes:  Negative for pain and visual disturbance.   Respiratory:  Negative for cough and shortness of breath.    Cardiovascular:  Negative for chest pain and palpitations.   Gastrointestinal:  Negative for abdominal pain and vomiting.   Genitourinary:  Negative for dysuria and hematuria.   Musculoskeletal:  Negative for arthralgias and back pain.   Skin:  Negative for color change and rash.   Neurological:  Negative for seizures and syncope.   All other systems reviewed and are negative.      Objective:  /78 (BP Location: Left arm, Patient Position: Sitting, Cuff Size: Large)   Pulse 81   Ht 5' 10\" (1.778 m)   Wt 117 kg (257 lb)   SpO2 98%   BMI 36.88 kg/m²     Physical Exam  Constitutional:       General: He is not in acute distress.     Appearance: Normal appearance. He is obese. He is not ill-appearing, toxic-appearing or diaphoretic.   HENT:      Head: Normocephalic and atraumatic.      Mouth/Throat:      Mouth: Mucous membranes are moist.   Eyes:      Extraocular Movements: Extraocular movements intact.   Pulmonary:      Effort: Pulmonary effort is normal.   Musculoskeletal:         General: Normal range of motion.      Cervical back: Normal range of motion.   Skin:     General: Skin is warm.   Neurological:      Mental Status: He is alert and oriented to person, place, and time.   Psychiatric:         Mood " "and Affect: Mood normal.         Behavior: Behavior normal.         Thought Content: Thought content normal.              Labs and Imaging  Recent labs and imaging have been personally reviewed.  Lab Results   Component Value Date    WBC 9.53 03/12/2025    HGB 16.0 03/12/2025    HCT 50.2 (H) 03/12/2025    MCV 90 03/12/2025     03/12/2025     Lab Results   Component Value Date    SODIUM 145 03/12/2025    K 4.3 03/12/2025     03/12/2025    CO2 23 03/12/2025    AGAP 17 (H) 03/12/2025    BUN 13 03/12/2025    CREATININE 0.97 03/12/2025    GLUF 101 (H) 03/12/2025    CALCIUM 9.8 03/12/2025    AST 28 03/12/2025    ALT 49 03/12/2025    ALKPHOS 75 03/12/2025    TP 8.1 03/12/2025    TBILI 0.76 03/12/2025    EGFR 97 03/12/2025     Lab Results   Component Value Date    HGBA1C 5.7 (H) 03/12/2025     No results found for: \"ESR2RQQHXZPM\", \"TSH\"  Lab Results   Component Value Date    CHOLESTEROL 154 03/12/2025     Lab Results   Component Value Date    HDL 35 (L) 03/12/2025     Lab Results   Component Value Date    TRIG 165 (H) 03/12/2025     Lab Results   Component Value Date    LDLCALC 86 03/12/2025           Weight Management  Kevin Ricci PA-C    "

## 2025-04-03 NOTE — ASSESSMENT & PLAN NOTE
- Discussed options of Healthy[facility]-Intensive Lifestyle Intervention Program, Very Low Calorie Diet-VLCD, and Conservative Program and the role of weight loss medications.  - Patient is interested in pursuing Conservative Program and follow up visits with medical weight management provider.  - Explained the importance of making lifestyle changes in addition to starting any anti-obesity medications.   - Initial weight loss goal of 5-10% weight loss for improved health. Weight loss can improve patient's co-morbid conditions and/or prevent weight-related complications.  - Weight is not at goal and patient has been unable to achieve a meaningful weight loss above 5% using various programs and tools for more than 6 months  - Labs reviewed.     General Recommendations:  Nutrition:  Eat breakfast daily.  Do not skip meals.      Food log (ie.) www.[facility].com, sparkpeople.com, loseit.com, calorieking.com, etc.     Practice mindful eating.  Be sure to set aside time to eat, eat slowly, and savor your food.     Hydration:    At least 64oz of water daily.  No sugar sweetened beverages.  No juice (eat the fruit instead).     Exercise:  Studies have shown that the ideal exercise goal is somewhere between 150 to 300 minutes of moderate intensity exercise a week.  Start with exercising 10 minutes every other day and gradually increase physical activity with a goal of at least 150 minutes of moderate intensity exercise a week, divided over at least 3 days a week.  An example of this would be exercising 30 minutes a day, 5 days a week.  Resistance training can increase muscle mass and increase our resting metabolic rate.   FULL BODY resistance training is recommended 2-3 times a week.  Do not do this on consecutive days to allow for muscle recovery.     Aim for a bare minimum 5000 steps, even on days you do not exercise.     Monitoring:   Weigh yourself daily.  If this causes undue stress, then just weigh yourself once a week.   Weigh yourself the same time of the day with the same amount of clothing on.  Preferably this should be done after waking up, before you eat, and with no clothing or minimal clothing on.     Specific Goals:  Calorie goal:  1700 tracey/day (Provided with meal plan to follow)    Zepbound Instructions:    - Begin Zepbound 2.5 mg subcutaneously once a week. Dose changes may occur after 4 doses if medication is tolerated. You will be assessed prior to each dose change to make sure you are tolerating the medication well.  - Please message me when you have 2 pens left from the prescription so there are no lapses in treatment.  - If you have been off the medication for more than 14 days please contact the office as you will need to restart the titration at the starting dose again to avoid significant side effects or adverse events.  - Visit Zepbound.com for further information/injection instructions.   -Please eat small frequent meals to help reduce nausea. Lemon water and saltine crackers may help with this.   - Side effects of Zepbound discussed: nausea, vomiting, diarrhea, and constipation. If you experience fever, nausea/vomiting, and pain radiating to your back this may be a sign of pancreatitis. Please go to the emergency room if this occurs.  - If on oral birth control a 2nd method of birth control is recommended during the 1st 8 weeks of therapy and for 4 weeks after any dosage change.   - Patient understands the side effects of the medication and proper administration. Patient agrees with the treatment plan and all questions were answered.    - Side effects of Zepbound: nausea, vomiting, diarrhea, and constipation. If severe abdominal pain develops, stop Zepbound and go to the ER, as this could be pancreatitis. Monitor heart rate while on Zepbound and if resting heart rate greater than 100 beats per minute, patient will notify me.   - If you need to have surgery or another procedure, such as an upper endoscopy or  colonoscopy, please contact my office as often medications like Zepbound need to be held for a certain amount of time prior to a procedure.       GLP-1 Managing Side Effects Tips:  - focus on small frequent meals  - moderate sugar and fat intake  - change the injection site (abdomen --> thigh--> back of arm)  - eat protein, crackers, mints and olga-based drinks  - nighttime injections  - drink plenty of water  - consider fiber supplements like miralax     Tips for Nausea:  - Don't drink and eat simultaneously: separate fluids 30-60 minutes before and after meals when experiencing nausea or if you notice you become full quickly  - Choose mild smelling foods- strong smells can exacerbate nausea  - Olga and peppermint- consider drinking olga or peppermint tea, which can help alleviate symptoms  - Eat- don't skip meals, if you have nausea, it is understandable that you may not feel like eating. However, skipping meals is generally not recommended as this can lead to lower blood sugar and fatigue. Furthermore, it is essential to consume adequate protein during weight loss. You can opt for a protein shake, a meal replacement supplement, bone broth or soup.      Tips for Constipation:   - Drink plenty of water  - Eat food with a high fiber content: increase your fiber intake by consuming these types of foods:              Eat more whole grain products like barley, oats, farro, brown or wild rice and quinoa.               Look for choices with 100% whole wheat, rye, oats or bran as the first ingredient listed               Check nutrition fact labels and choose products with 4gm of dietary fiber or more per serving              Add more beans to your diet              Eat more fresh fruits and vegetables with skins on them               Exercise- increase physical activity as it helps stimulate gastric mobility, which moves stool through the digestive tract     Patient denies personal history of pancreatitis. Patient  also denies personal and family history of medullary thyroid cancer and multiple endocrine neoplasia type 2 (MEN 2 tumor). Increased risk for the aforementioned disease processes as well as those included in the medication monograph, associated with taking GLP-1 medication discussed. Patient acknowledges and would like to proceed with treatment. Patient also denies pregnancy. Expresses understanding that medication is not safe in pregnancy and will discontinue in the event of pregnancy. Patient demonstrates full understanding verbally. All questions answered.       Patient understands the side effects of the medication and proper administration. Patient agrees with the treatment plan and all questions were answered.

## 2025-04-03 NOTE — TELEPHONE ENCOUNTER
Reason for call:   [x] Prior Auth  [] Other:     Caller:  [] Patient  [x] Pharmacy  Name: [facility]  [facility]  Callback Number: [phone]     Medication: Zepbound    Dose/Frequency: Inject 0.5ml (2.5mg total) under the skin once a week for 28 days    Quantity: 2ml    Ordering Provider:   [] PCP/Provider -   [x] Speciality/Provider - YASH Ricci PA-C

## 2025-04-03 NOTE — PATIENT INSTRUCTIONS
- Discussed options of HealthyCORE-Intensive Lifestyle Intervention Program, Very Low Calorie Diet-VLCD, and Conservative Program and the role of weight loss medications.  - Patient is interested in pursuing Conservative Program and follow up visits with medical weight management provider.  - Explained the importance of making lifestyle changes in addition to starting any anti-obesity medications.   - Initial weight loss goal of 5-10% weight loss for improved health. Weight loss can improve patient's co-morbid conditions and/or prevent weight-related complications.  - Weight is not at goal and patient has been unable to achieve a meaningful weight loss above 5% using various programs and tools for more than 6 months  - Labs reviewed.     General Recommendations:  Nutrition:  Eat breakfast daily.  Do not skip meals.      Food log (ie.) www.[employer].com, sparkpeople.com, loseit.com, calorieking.com, etc.     Practice mindful eating.  Be sure to set aside time to eat, eat slowly, and savor your food.     Hydration:    At least 64oz of water daily.  No sugar sweetened beverages.  No juice (eat the fruit instead).     Exercise:  Studies have shown that the ideal exercise goal is somewhere between 150 to 300 minutes of moderate intensity exercise a week.  Start with exercising 10 minutes every other day and gradually increase physical activity with a goal of at least 150 minutes of moderate intensity exercise a week, divided over at least 3 days a week.  An example of this would be exercising 30 minutes a day, 5 days a week.  Resistance training can increase muscle mass and increase our resting metabolic rate.   FULL BODY resistance training is recommended 2-3 times a week.  Do not do this on consecutive days to allow for muscle recovery.     Aim for a bare minimum 5000 steps, even on days you do not exercise.     Monitoring:   Weigh yourself daily.  If this causes undue stress, then just weigh yourself once a week.   Weigh yourself the same time of the day with the same amount of clothing on.  Preferably this should be done after waking up, before you eat, and with no clothing or minimal clothing on.     Specific Goals:  Calorie goal:  1700 tracey/day (Provided with meal plan to follow)    Zepbound Instructions:    - Begin Zepbound 2.5 mg subcutaneously once a week. Dose changes may occur after 4 doses if medication is tolerated. You will be assessed prior to each dose change to make sure you are tolerating the medication well.  - Please message me when you have 2 pens left from the prescription so there are no lapses in treatment.  - If you have been off the medication for more than 14 days please contact the office as you will need to restart the titration at the starting dose again to avoid significant side effects or adverse events.  - Visit Zepbound.com for further information/injection instructions.   -Please eat small frequent meals to help reduce nausea. Lemon water and saltine crackers may help with this.   - Side effects of Zepbound discussed: nausea, vomiting, diarrhea, and constipation. If you experience fever, nausea/vomiting, and pain radiating to your back this may be a sign of pancreatitis. Please go to the emergency room if this occurs.  - If on oral birth control a 2nd method of birth control is recommended during the 1st 8 weeks of therapy and for 4 weeks after any dosage change.   - Patient understands the side effects of the medication and proper administration. Patient agrees with the treatment plan and all questions were answered.    - Side effects of Zepbound: nausea, vomiting, diarrhea, and constipation. If severe abdominal pain develops, stop Zepbound and go to the ER, as this could be pancreatitis. Monitor heart rate while on Zepbound and if resting heart rate greater than 100 beats per minute, patient will notify me.   - If you need to have surgery or another procedure, such as an upper endoscopy or  colonoscopy, please contact my office as often medications like Zepbound need to be held for a certain amount of time prior to a procedure.       GLP-1 Managing Side Effects Tips:  - focus on small frequent meals  - moderate sugar and fat intake  - change the injection site (abdomen --> thigh--> back of arm)  - eat protein, crackers, mints and olga-based drinks  - nighttime injections  - drink plenty of water  - consider fiber supplements like miralax     Tips for Nausea:  - Don't drink and eat simultaneously: separate fluids 30-60 minutes before and after meals when experiencing nausea or if you notice you become full quickly  - Choose mild smelling foods- strong smells can exacerbate nausea  - Olga and peppermint- consider drinking olga or peppermint tea, which can help alleviate symptoms  - Eat- don't skip meals, if you have nausea, it is understandable that you may not feel like eating. However, skipping meals is generally not recommended as this can lead to lower blood sugar and fatigue. Furthermore, it is essential to consume adequate protein during weight loss. You can opt for a protein shake, a meal replacement supplement, bone broth or soup.      Tips for Constipation:   - Drink plenty of water  - Eat food with a high fiber content: increase your fiber intake by consuming these types of foods:              Eat more whole grain products like barley, oats, farro, brown or wild rice and quinoa.               Look for choices with 100% whole wheat, rye, oats or bran as the first ingredient listed               Check nutrition fact labels and choose products with 4gm of dietary fiber or more per serving              Add more beans to your diet              Eat more fresh fruits and vegetables with skins on them               Exercise- increase physical activity as it helps stimulate gastric mobility, which moves stool through the digestive tract     Patient denies personal history of pancreatitis. Patient  also denies personal and family history of medullary thyroid cancer and multiple endocrine neoplasia type 2 (MEN 2 tumor). Increased risk for the aforementioned disease processes as well as those included in the medication monograph, associated with taking GLP-1 medication discussed. Patient acknowledges and would like to proceed with treatment. Patient also denies pregnancy. Expresses understanding that medication is not safe in pregnancy and will discontinue in the event of pregnancy. Patient demonstrates full understanding verbally. All questions answered.       Patient understands the side effects of the medication and proper administration. Patient agrees with the treatment plan and all questions were answered.

## 2025-04-09 PROBLEM — E66.9 OBESITY (BMI 30-39.9): Status: RESOLVED | Noted: 2025-03-11 | Resolved: 2025-04-09

## 2025-04-09 NOTE — ASSESSMENT & PLAN NOTE
Started Lotrel 5-20 at last visit  BP today in office is 114/78 Well controlled    Recommend at-home BP monitoring to ensure control   continue current Lotrel dosing, refills provided

## 2025-04-09 NOTE — PROGRESS NOTES
"Name: Chuck Dodd      : 1985      MRN: [id number]  Encounter Provider: Pipo Ordonez PA-C  Encounter Date: 4/10/2025   Encounter department: [facility]    Assessment & Plan  Primary hypertension  Started Lotrel 5-20 at last visit  BP today in office is 114/78 Well controlled    Recommend at-home BP monitoring to ensure control   continue current Lotrel dosing, refills provided            History of Present Illness     Chuck Dodd is a 39 y.o. male  presenting for HTN f/u        **Note: Portions of the record may have been created with voice recognition software.  Occasional wrong word or \"sound alike\" substitutions may have occurred due to the inherent limitations of voice recognition software.  Please read the chart carefully and recognize, using context, where substitutions have occurred. Please contact for further clarification, when necessary.       Review of Systems   Constitutional:  Negative for chills and fever.   HENT:  Negative for ear pain and sore throat.    Eyes:  Negative for pain and visual disturbance.   Respiratory:  Negative for cough and shortness of breath.    Cardiovascular:  Negative for chest pain and palpitations.   Gastrointestinal:  Negative for abdominal pain and vomiting.   Genitourinary:  Negative for dysuria and hematuria.   Musculoskeletal:  Negative for arthralgias and back pain.   Skin:  Negative for color change and rash.   Neurological:  Negative for seizures and syncope.   All other systems reviewed and are negative.    Past Medical History:   Diagnosis Date    ADHD     GERD (gastroesophageal reflux disease)     Headache(784.0)      Past Surgical History:   Procedure Laterality Date    CHOLECYSTECTOMY      AK ESOPHAGOGASTRODUODENOSCOPY TRANSORAL DIAGNOSTIC N/A 2018    Procedure: ESOPHAGOGASTRODUODENOSCOPY (EGD);  Surgeon: Juanpablo Hoyt III, MD;  Location: [facility];  Service: Gastroenterology    AK EXCISION PILONIDAL CYST/SINUS " COMPLICATED N/A 2/28/2023    Procedure: EXCISION PILONIDAL CYST;  Surgeon: Dexter Solis MD;  Location: [facility];  Service: General     Family History   Problem Relation Age of Onset    No Known Problems Mother     Cancer Father     COPD Father     No Known Problems Brother     No Known Problems Daughter      Social History     Tobacco Use    Smoking status: Former     Current packs/day: 0.25     Types: Cigarettes    Smokeless tobacco: Never   Vaping Use    Vaping status: Some Days    Substances: Nicotine   Substance and Sexual Activity    Alcohol use: Yes     Comment: 6 x year    Drug use: Not Currently     Types: Marijuana    Sexual activity: Yes     Partners: Female     Current Outpatient Medications on File Prior to Visit   Medication Sig    amLODIPine-benazepril (LOTREL 5-20) 5-20 MG per capsule Take 1 capsule by mouth daily    tirzepatide (Zepbound) 2.5 mg/0.5 mL auto-injector Inject 0.5 mL (2.5 mg total) under the skin once a week for 28 days    tiZANidine (ZANAFLEX) 4 mg tablet Take 1 tablet (4 mg total) by mouth every 8 (eight) hours as needed for muscle spasms Take as needed, avoid alcohol. May cause sedation.    [DISCONTINUED] diazepam (VALIUM) 5 mg tablet Take 1 tablet (5 mg total) by mouth 2 (two) times a day for 10 doses     No Known Allergies  Immunization History   Administered Date(s) Administered    COVID-19 J&J ([employer]) vaccine 0.5 mL 05/05/2021    Tdap 03/11/2025     Objective   There were no vitals taken for this visit.    Physical Exam  Vitals and nursing note reviewed.   Constitutional:       General: He is not in acute distress.     Appearance: He is well-developed.   HENT:      Head: Normocephalic and atraumatic.   Eyes:      Conjunctiva/sclera: Conjunctivae normal.   Cardiovascular:      Rate and Rhythm: Normal rate and regular rhythm.      Heart sounds: No murmur heard.  Pulmonary:      Effort: Pulmonary effort is normal. No respiratory distress.      Breath sounds: Normal breath  sounds.   Abdominal:      General: Abdomen is flat.   Musculoskeletal:         General: No swelling.      Cervical back: Neck supple.   Skin:     General: Skin is warm and dry.   Neurological:      Mental Status: He is alert and oriented to person, place, and time.   Psychiatric:         Mood and Affect: Mood normal.

## 2025-04-10 ENCOUNTER — OFFICE VISIT (OUTPATIENT)
Dept: FAMILY MEDICINE CLINIC | Facility: CLINIC | Age: 40
End: 2025-04-10
Payer: COMMERCIAL

## 2025-04-10 VITALS
WEIGHT: 256 LBS | HEIGHT: 70 IN | OXYGEN SATURATION: 97 % | HEART RATE: 70 BPM | DIASTOLIC BLOOD PRESSURE: 78 MMHG | BODY MASS INDEX: 36.65 KG/M2 | SYSTOLIC BLOOD PRESSURE: 114 MMHG | TEMPERATURE: 97.6 F

## 2025-04-10 DIAGNOSIS — I10 PRIMARY HYPERTENSION: Primary | ICD-10-CM

## 2025-04-10 PROCEDURE — 99213 OFFICE O/P EST LOW 20 MIN: CPT

## 2025-04-10 RX ORDER — AMLODIPINE AND BENAZEPRIL HYDROCHLORIDE 5; 20 MG/1; MG/1
1 CAPSULE ORAL DAILY
Qty: 100 CAPSULE | Refills: 2 | Status: SHIPPED | OUTPATIENT
Start: 2025-04-10

## 2025-04-24 ENCOUNTER — TELEPHONE (OUTPATIENT)
Dept: BARIATRICS | Facility: CLINIC | Age: 40
End: 2025-04-24

## 2025-04-24 DIAGNOSIS — E78.1 HYPERTRIGLYCERIDEMIA: ICD-10-CM

## 2025-04-24 DIAGNOSIS — E66.812 OBESITY, CLASS II, BMI 35-39.9: Primary | ICD-10-CM

## 2025-04-24 DIAGNOSIS — I10 PRIMARY HYPERTENSION: ICD-10-CM

## 2025-04-24 DIAGNOSIS — R73.03 PREDIABETES: ICD-10-CM

## 2025-04-24 RX ORDER — TIRZEPATIDE 5 MG/.5ML
5 INJECTION, SOLUTION SUBCUTANEOUS WEEKLY
Qty: 2 ML | Refills: 2 | Status: SHIPPED | OUTPATIENT
Start: 2025-04-24 | End: 2025-07-17

## 2025-04-24 NOTE — TELEPHONE ENCOUNTER
How are you tolerating the medication?   [] Nausea  [] Vomiting  [] Diarrhea  [x] Asymptomatic  [] Other:    Last visit weight: 255 lbs     Current weight: 243 lbs     Date of last injection: today     How many injections do you have left: 1    Pt would like next dose of zepbound sent to the [facility] on [address]

## 2025-04-30 DIAGNOSIS — I10 PRIMARY HYPERTENSION: ICD-10-CM

## 2025-04-30 RX ORDER — AMLODIPINE AND BENAZEPRIL HYDROCHLORIDE 5; 20 MG/1; MG/1
1 CAPSULE ORAL DAILY
Qty: 100 CAPSULE | Refills: 2 | Status: SHIPPED | OUTPATIENT
Start: 2025-04-30

## 2025-05-23 ENCOUNTER — TELEPHONE (OUTPATIENT)
Age: 40
End: 2025-05-23

## 2025-05-23 NOTE — TELEPHONE ENCOUNTER
How are you tolerating the medication?   [] Nausea  [] Vomiting  [] Diarrhea  [] Asymptomatic  [x] Other: [place]    Last visit weight:256    Current weight: 235    Date of last injection: 5/22/25     How many injections do you have left: 1    Zepbound 5mg, would like to go up to 7     Pharmacy: [facility]

## 2025-06-04 ENCOUNTER — NURSE TRIAGE (OUTPATIENT)
Age: 40
End: 2025-06-04

## 2025-06-04 NOTE — TELEPHONE ENCOUNTER
Patient denies symptoms today or yesterday and has not taken bp meds either day. Office visit made for tomorrow morning to discuss. He declined sooner due to his work schedule. He will call back with any concerns.

## 2025-06-04 NOTE — TELEPHONE ENCOUNTER
Please inform pt given symptomatic low blood pressure can hold BP medication and continue to take at home BP measurements. Agree pt should be seen and will discuss with him tomorrow

## 2025-06-04 NOTE — TELEPHONE ENCOUNTER
Regarding: hypotension on Ozempic  ----- Message from Mounika HER sent at 6/4/2025  8:28 AM EDT -----  My Chart message from 6/3 614pm    Hello since I been on zepbound and my BP meds I have been getting very light headed and dizzy. I have been doing my BP and I'm averaging around 95/65-75. I didn't take my BP meds today and didn't get dizzy at all but my BP was still 102/78. Is there a way to stop the BP meds or change doses?

## 2025-06-05 ENCOUNTER — OFFICE VISIT (OUTPATIENT)
Dept: FAMILY MEDICINE CLINIC | Facility: CLINIC | Age: 40
End: 2025-06-05
Payer: COMMERCIAL

## 2025-06-05 VITALS
DIASTOLIC BLOOD PRESSURE: 70 MMHG | HEART RATE: 78 BPM | BODY MASS INDEX: 32.93 KG/M2 | OXYGEN SATURATION: 99 % | TEMPERATURE: 98.1 F | HEIGHT: 70 IN | SYSTOLIC BLOOD PRESSURE: 122 MMHG | WEIGHT: 230 LBS

## 2025-06-05 DIAGNOSIS — I10 PRIMARY HYPERTENSION: Primary | ICD-10-CM

## 2025-06-05 PROCEDURE — 99213 OFFICE O/P EST LOW 20 MIN: CPT

## 2025-06-05 NOTE — ASSESSMENT & PLAN NOTE
Patient presents with concerns of symptomatic hypotension  He has been continuing taking his blood pressure medicine as prescribed, but has lost 2030 pounds and has been getting hypertensive measurements at home.  He has discontinued blood pressure medication for the last few days and presents today for follow-up  BP today in office is 122/70 Well controlled    Recommend at-home BP monitoring to ensure control   stop blood pressure medication at this time, recommended continued monitoring for 2 to 3 days a week of blood pressures taken twice daily to ensure no rebound hypertension  Suspect control of hypertension in the setting of weight loss, continue to monitor

## 2025-06-05 NOTE — PROGRESS NOTES
"Name: Chuck Dodd      : 1985      MRN: [id number]  Encounter Provider: Pipo Ordonez PA-C  Encounter Date: 2025   Encounter department: [facility]    Assessment & Plan  Primary hypertension  Patient presents with concerns of symptomatic hypotension  He has been continuing taking his blood pressure medicine as prescribed, but has lost 2030 pounds and has been getting hypertensive measurements at home.  He has discontinued blood pressure medication for the last few days and presents today for follow-up  BP today in office is 122/70 Well controlled    Recommend at-home BP monitoring to ensure control   stop blood pressure medication at this time, recommended continued monitoring for 2 to 3 days a week of blood pressures taken twice daily to ensure no rebound hypertension  Suspect control of hypertension in the setting of weight loss, continue to monitor            History of Present Illness     Chuck Dodd is a 39 y.o. male  presenting for hypertension follow-up.  He has been getting low blood pressure readings with his current medication regimen.  He is also lost a significant mount weight recently, which has been planned on Zepbound use.        **Note: Portions of the record may have been created with voice recognition software.  Occasional wrong word or \"sound alike\" substitutions may have occurred due to the inherent limitations of voice recognition software.  Please read the chart carefully and recognize, using context, where substitutions have occurred. Please contact for further clarification, when necessary.         Review of Systems   Constitutional:  Negative for chills and fever.   HENT:  Negative for ear pain and sore throat.    Eyes:  Negative for pain and visual disturbance.   Respiratory:  Negative for cough and shortness of breath.    Cardiovascular:  Negative for chest pain and palpitations.   Gastrointestinal:  Negative for abdominal pain and vomiting. " "  Genitourinary:  Negative for dysuria and hematuria.   Musculoskeletal:  Negative for arthralgias and back pain.   Skin:  Negative for color change and rash.   Neurological:  Positive for dizziness (Hypotensive) and light-headedness. Negative for seizures and syncope.   All other systems reviewed and are negative.    Past Medical History[1]  Past Surgical History[2]  Family History[3]  Social History[4]  Medications[5]  No Known Allergies  Immunization History   Administered Date(s) Administered    COVID-19 J&J ([employer]) vaccine 0.5 mL 05/05/2021    Tdap 03/11/2025     Objective   /70   Pulse 78   Temp 98.1 °F (36.7 °C)   Ht 5' 10\" (1.778 m)   Wt 104 kg (230 lb)   SpO2 99%   BMI 33.00 kg/m²     Physical Exam  Vitals and nursing note reviewed.   Constitutional:       General: He is not in acute distress.     Appearance: He is well-developed.   HENT:      Head: Normocephalic and atraumatic.     Eyes:      Conjunctiva/sclera: Conjunctivae normal.       Cardiovascular:      Rate and Rhythm: Normal rate and regular rhythm.      Heart sounds: No murmur heard.  Pulmonary:      Effort: Pulmonary effort is normal. No respiratory distress.      Breath sounds: Normal breath sounds.   Abdominal:      Palpations: Abdomen is soft.      Tenderness: There is no abdominal tenderness.     Musculoskeletal:         General: No swelling.      Cervical back: Neck supple.     Skin:     General: Skin is warm and dry.     Neurological:      Mental Status: He is alert and oriented to person, place, and time.     Psychiatric:         Mood and Affect: Mood normal.                [1]   Past Medical History:  Diagnosis Date    ADHD     GERD (gastroesophageal reflux disease)     Headache(784.0)    [2]   Past Surgical History:  Procedure Laterality Date    CHOLECYSTECTOMY      AK ESOPHAGOGASTRODUODENOSCOPY TRANSORAL DIAGNOSTIC N/A 4/18/2018    Procedure: ESOPHAGOGASTRODUODENOSCOPY (EGD);  Surgeon: Juanpablo Hoyt III, MD;  Location: " [facility];  Service: Gastroenterology    [place] EXCISION PILONIDAL CYST/SINUS COMPLICATED N/A 2/28/2023    Procedure: EXCISION PILONIDAL CYST;  Surgeon: Dexter Solis MD;  Location: [facility];  Service: General   [3]   Family History  Problem Relation Name Age of Onset    No Known Problems Mother      Cancer Father David Dodd     COPD Father David Dodd     No Known Problems Brother      No Known Problems Daughter     [4]   Social History  Tobacco Use    Smoking status: Former     Current packs/day: 0.25     Types: Cigarettes    Smokeless tobacco: Never   Vaping Use    Vaping status: Some Days    Substances: Nicotine   Substance and Sexual Activity    Alcohol use: Yes     Comment: 6 x year    Drug use: Not Currently     Types: Marijuana    Sexual activity: Yes     Partners: Female   [5]   Current Outpatient Medications on File Prior to Visit   Medication Sig    amLODIPine-benazepril (LOTREL 5-20) 5-20 MG per capsule Take 1 capsule by mouth daily    tirzepatide (Zepbound) 7.5 mg/0.5 mL auto-injector Inject 0.5 mL (7.5 mg total) under the skin once a week for 28 days    [DISCONTINUED] diazepam (VALIUM) 5 mg tablet Take 1 tablet (5 mg total) by mouth 2 (two) times a day for 10 doses    [DISCONTINUED] tiZANidine (ZANAFLEX) 4 mg tablet Take 1 tablet (4 mg total) by mouth every 8 (eight) hours as needed for muscle spasms Take as needed, avoid alcohol. May cause sedation.

## 2025-06-17 ENCOUNTER — PATIENT MESSAGE (OUTPATIENT)
Dept: BARIATRICS | Facility: CLINIC | Age: 40
End: 2025-06-17

## 2025-06-17 DIAGNOSIS — R73.03 PREDIABETES: ICD-10-CM

## 2025-06-17 DIAGNOSIS — E66.9 OBESITY (BMI 30-39.9): ICD-10-CM

## 2025-06-17 DIAGNOSIS — E78.1 HYPERTRIGLYCERIDEMIA: ICD-10-CM

## 2025-06-17 DIAGNOSIS — E66.812 OBESITY, CLASS II, BMI 35-39.9: Primary | ICD-10-CM

## 2025-06-17 DIAGNOSIS — I10 PRIMARY HYPERTENSION: ICD-10-CM

## 2025-06-18 RX ORDER — TIRZEPATIDE 10 MG/.5ML
10 INJECTION, SOLUTION SUBCUTANEOUS WEEKLY
Qty: 2 ML | Refills: 2 | Status: SHIPPED | OUTPATIENT
Start: 2025-06-18 | End: 2025-09-10

## 2025-07-09 ENCOUNTER — OFFICE VISIT (OUTPATIENT)
Dept: BARIATRICS | Facility: CLINIC | Age: 40
End: 2025-07-09
Payer: COMMERCIAL

## 2025-07-09 VITALS
OXYGEN SATURATION: 97 % | SYSTOLIC BLOOD PRESSURE: 118 MMHG | WEIGHT: 216 LBS | HEIGHT: 70 IN | HEART RATE: 86 BPM | BODY MASS INDEX: 30.92 KG/M2 | DIASTOLIC BLOOD PRESSURE: 82 MMHG

## 2025-07-09 DIAGNOSIS — E66.812 OBESITY, CLASS II, BMI 35-39.9: Primary | ICD-10-CM

## 2025-07-09 DIAGNOSIS — R73.03 PREDIABETES: ICD-10-CM

## 2025-07-09 DIAGNOSIS — I10 PRIMARY HYPERTENSION: ICD-10-CM

## 2025-07-09 DIAGNOSIS — E78.1 HYPERTRIGLYCERIDEMIA: ICD-10-CM

## 2025-07-09 PROCEDURE — 99214 OFFICE O/P EST MOD 30 MIN: CPT | Performed by: PHYSICAL THERAPIST

## 2025-07-09 NOTE — PROGRESS NOTES
Assessment/Plan:     Obesity, Class II, BMI 35-39.9  - Continue Zepbound 10 mg.   - Consider Over-the-counter medications (colase, dulcolax) for constipation.; Fiber gummies.  - May also consider Zantac or Tums for reflux.    - Discussed options of HealthyCORE-Intensive Lifestyle Intervention Program, Very Low Calorie Diet-VLCD, and Conservative Program and the role of weight loss medications.  - Patient is interested in pursuing Conservative Program and follow up visits with medical weight management provider.  - Explained the importance of making lifestyle changes in addition to starting any anti-obesity medications.   - Initial weight loss goal of 5-10% weight loss for improved health. Weight loss can improve patient's co-morbid conditions and/or prevent weight-related complications.  - Weight is not at goal and patient has been unable to achieve a meaningful weight loss above 5% using various programs and tools for more than 6 months  - Labs reviewed.      General Recommendations:  Nutrition:  Eat breakfast daily.  Do not skip meals.      Food log (ie.) [email or web address], sparkpeople.com, loseit.com, calorieking.com, etc.     Practice mindful eating.  Be sure to set aside time to eat, eat slowly, and savor your food.     Hydration:    At least 64oz of water daily.  No sugar sweetened beverages.  No juice (eat the fruit instead).     Exercise:  Studies have shown that the ideal exercise goal is somewhere between 150 to 300 minutes of moderate intensity exercise a week.  Start with exercising 10 minutes every other day and gradually increase physical activity with a goal of at least 150 minutes of moderate intensity exercise a week, divided over at least 3 days a week.  An example of this would be exercising 30 minutes a day, 5 days a week.  Resistance training can increase muscle mass and increase our resting metabolic rate.   FULL BODY resistance training is recommended 2-3 times a week.  Do not do this on  consecutive days to allow for muscle recovery.     Aim for a bare minimum 5000 steps, even on days you do not exercise.     Monitoring:   Weigh yourself daily.  If this causes undue stress, then just weigh yourself once a week.  Weigh yourself the same time of the day with the same amount of clothing on.  Preferably this should be done after waking up, before you eat, and with no clothing or minimal clothing on.     Specific Goals:  Calorie goal:  1800 tracey/day (Provided with meal plan to follow)         Chuck was seen today for follow-up.    Diagnoses and all orders for this visit:    Obesity, Class II, BMI 35-39.9    Hypertriglyceridemia    Prediabetes    Primary hypertension        Total time spent reviewing chart, interviewing patient, examining patient, discussing plan, answering all questions, and documentin minutes with >50% face-to-face time with the patient.    Follow up in approximately 3 months with Non-Surgical Physician/Advanced Practitioner.    Subjective:   Chief Complaint   Patient presents with    Follow-up       Patient ID: Chuck Dodd  is a 39 y.o. male with excess weight/obesity here to pursue weight management.  Patient is pursuing Conservative Program. Doing very well overall. Some gerd and constipation. Otherwise tolerating well. Adherent with meal plan   Most recent notes and records were reviewed.    HPI    Wt Readings from Last 20 Encounters:   25 98 kg (216 lb)   25 104 kg (230 lb)   04/10/25 116 kg (256 lb)   25 117 kg (257 lb)   25 119 kg (262 lb)   25 119 kg (262 lb)   23 117 kg (259 lb)   23 119 kg (262 lb)   23 118 kg (260 lb)   23 118 kg (260 lb 6 oz)   22 118 kg (261 lb)   22 119 kg (263 lb)   22 119 kg (263 lb 4 oz)   22 120 kg (265 lb)   22 120 kg (264 lb)   18 104 kg (230 lb)   18 108 kg (238 lb)   10/17/18 104 kg (230 lb)   18 98.9 kg (218 lb)   18 103 kg (227 lb  "11.8 oz)       Patient presents today to medical weight management office for follow up.    Weight loss medication and dose: Zepbound 10 mg  Starting MWM weight: 257 lbs (4/3/2025)  Current Weight: 216 lbs (2025)  Difference: -41 lbs     Starting BMI: 36.88 (4/3/2025)  Current BMI: 30.99 (2025)  Goal weight: healthy BMI; 215 lbs         Hydration: 3-4 bottles of water; sweetened iced tea and lemonade   Alcohol: rarely  Smoking: vaping  Exercise: no formal exercise  Occupation: works in [employer]  Sleep: 7-8 hrs  STOP ban/8     Colonoscopy: N/A      The following portions of the patient's history were reviewed and updated as appropriate: allergies, current medications, past family history, past medical history, past social history, past surgical history, and problem list.    Family History[1]     Review of Systems   Constitutional:  Negative for chills and fever.   HENT:  Negative for ear pain and sore throat.    Eyes:  Negative for pain and visual disturbance.   Respiratory:  Negative for cough and shortness of breath.    Cardiovascular:  Negative for chest pain and palpitations.   Gastrointestinal:  Negative for abdominal pain and vomiting.   Genitourinary:  Negative for dysuria and hematuria.   Musculoskeletal:  Negative for arthralgias and back pain.   Skin:  Negative for color change and rash.   Neurological:  Negative for seizures and syncope.   All other systems reviewed and are negative.      Objective:  /82 (BP Location: Left arm, Patient Position: Sitting, Cuff Size: Large)   Pulse 86   Ht 5' 10\" (1.778 m)   Wt 98 kg (216 lb)   SpO2 97%   BMI 30.99 kg/m²     Physical Exam  Constitutional:       General: He is not in acute distress.     Appearance: Normal appearance. He is obese. He is not ill-appearing, toxic-appearing or diaphoretic.   HENT:      Head: Normocephalic and atraumatic.      Mouth/Throat:      Mouth: Mucous membranes are moist.     Eyes:      Extraocular Movements: " "Extraocular movements intact.     Pulmonary:      Effort: Pulmonary effort is normal.     Musculoskeletal:         General: Normal range of motion.      Cervical back: Normal range of motion.     Skin:     General: Skin is warm.     Neurological:      Mental Status: He is alert and oriented to person, place, and time.     Psychiatric:         Mood and Affect: Mood normal.         Behavior: Behavior normal.         Thought Content: Thought content normal.            Labs   Most recent labs reviewed   Lab Results   Component Value Date    SODIUM 145 03/12/2025    K 4.3 03/12/2025     03/12/2025    CO2 23 03/12/2025    AGAP 17 (H) 03/12/2025    BUN 13 03/12/2025    CREATININE 0.97 03/12/2025    GLUF 101 (H) 03/12/2025    CALCIUM 9.8 03/12/2025    AST 28 03/12/2025    ALT 49 03/12/2025    ALKPHOS 75 03/12/2025    TP 8.1 03/12/2025    TBILI 0.76 03/12/2025    EGFR 97 03/12/2025     Lab Results   Component Value Date    HGBA1C 5.7 (H) 03/12/2025     No results found for: \"[id number]\", \"TSH\"  Lab Results   Component Value Date    CHOLESTEROL 154 03/12/2025     Lab Results   Component Value Date    HDL 35 (L) 03/12/2025     Lab Results   Component Value Date    TRIG 165 (H) 03/12/2025     Lab Results   Component Value Date    LDLCALC 86 03/12/2025           Weight Management  Kevin Ricci PA-C         [1]   Family History  Problem Relation Name Age of Onset    No Known Problems Mother      Cancer Father David Dodd     COPD Father David Dodd     No Known Problems Brother      No Known Problems Daughter       "

## 2025-07-09 NOTE — PATIENT INSTRUCTIONS
- Continue Zepbound 10 mg.   - Consider Over-the-counter medications (colase, dulcolax) for constipation.; Fiber gummies.  - May also consider Zantac or Tums for reflux.    - Discussed options of Healthy[facility]-Intensive Lifestyle Intervention Program, Very Low Calorie Diet-VLCD, and Conservative Program and the role of weight loss medications.  - Patient is interested in pursuing Conservative Program and follow up visits with medical weight management provider.  - Explained the importance of making lifestyle changes in addition to starting any anti-obesity medications.   - Initial weight loss goal of 5-10% weight loss for improved health. Weight loss can improve patient's co-morbid conditions and/or prevent weight-related complications.  - Weight is not at goal and patient has been unable to achieve a meaningful weight loss above 5% using various programs and tools for more than 6 months  - Labs reviewed.      General Recommendations:  Nutrition:  Eat breakfast daily.  Do not skip meals.      Food log (ie.) www.[email or web address], sparkpeople.com, loseit.com, calorieking.com, etc.     Practice mindful eating.  Be sure to set aside time to eat, eat slowly, and savor your food.     Hydration:    At least 64oz of water daily.  No sugar sweetened beverages.  No juice (eat the fruit instead).     Exercise:  Studies have shown that the ideal exercise goal is somewhere between 150 to 300 minutes of moderate intensity exercise a week.  Start with exercising 10 minutes every other day and gradually increase physical activity with a goal of at least 150 minutes of moderate intensity exercise a week, divided over at least 3 days a week.  An example of this would be exercising 30 minutes a day, 5 days a week.  Resistance training can increase muscle mass and increase our resting metabolic rate.   FULL BODY resistance training is recommended 2-3 times a week.  Do not do this on consecutive days to allow for muscle recovery.     Aim  for a bare minimum 5000 steps, even on days you do not exercise.     Monitoring:   Weigh yourself daily.  If this causes undue stress, then just weigh yourself once a week.  Weigh yourself the same time of the day with the same amount of clothing on.  Preferably this should be done after waking up, before you eat, and with no clothing or minimal clothing on.     Specific Goals:  Calorie goal:  1800 tracey/day (Provided with meal plan to follow)

## 2025-07-09 NOTE — ASSESSMENT & PLAN NOTE
- Continue Zepbound 10 mg.   - Consider Over-the-counter medications (colase, dulcolax) for constipation.; Fiber gummies.  - May also consider Zantac or Tums for reflux.    - Discussed options of HealthyCORE-Intensive Lifestyle Intervention Program, Very Low Calorie Diet-VLCD, and Conservative Program and the role of weight loss medications.  - Patient is interested in pursuing Conservative Program and follow up visits with medical weight management provider.  - Explained the importance of making lifestyle changes in addition to starting any anti-obesity medications.   - Initial weight loss goal of 5-10% weight loss for improved health. Weight loss can improve patient's co-morbid conditions and/or prevent weight-related complications.  - Weight is not at goal and patient has been unable to achieve a meaningful weight loss above 5% using various programs and tools for more than 6 months  - Labs reviewed.      General Recommendations:  Nutrition:  Eat breakfast daily.  Do not skip meals.      Food log (ie.) www.[employer].com, sparkpeople.com, loseit.com, calorieking.com, etc.     Practice mindful eating.  Be sure to set aside time to eat, eat slowly, and savor your food.     Hydration:    At least 64oz of water daily.  No sugar sweetened beverages.  No juice (eat the fruit instead).     Exercise:  Studies have shown that the ideal exercise goal is somewhere between 150 to 300 minutes of moderate intensity exercise a week.  Start with exercising 10 minutes every other day and gradually increase physical activity with a goal of at least 150 minutes of moderate intensity exercise a week, divided over at least 3 days a week.  An example of this would be exercising 30 minutes a day, 5 days a week.  Resistance training can increase muscle mass and increase our resting metabolic rate.   FULL BODY resistance training is recommended 2-3 times a week.  Do not do this on consecutive days to allow for muscle recovery.     Aim  for a bare minimum 5000 steps, even on days you do not exercise.     Monitoring:   Weigh yourself daily.  If this causes undue stress, then just weigh yourself once a week.  Weigh yourself the same time of the day with the same amount of clothing on.  Preferably this should be done after waking up, before you eat, and with no clothing or minimal clothing on.     Specific Goals:  Calorie goal:  1800 tracey/day (Provided with meal plan to follow)

## 2025-07-24 ENCOUNTER — PATIENT MESSAGE (OUTPATIENT)
Dept: SURGERY | Facility: CLINIC | Age: 40
End: 2025-07-24

## 2025-07-24 DIAGNOSIS — E66.812 OBESITY, CLASS II, BMI 35-39.9: Primary | ICD-10-CM

## 2025-07-24 DIAGNOSIS — I10 PRIMARY HYPERTENSION: ICD-10-CM

## 2025-07-24 DIAGNOSIS — R73.03 PREDIABETES: ICD-10-CM

## 2025-07-24 DIAGNOSIS — E78.1 HYPERTRIGLYCERIDEMIA: ICD-10-CM

## 2025-07-25 ENCOUNTER — PATIENT MESSAGE (OUTPATIENT)
Dept: SURGERY | Facility: CLINIC | Age: 40
End: 2025-07-25

## 2025-07-25 DIAGNOSIS — R73.03 PREDIABETES: ICD-10-CM

## 2025-07-25 DIAGNOSIS — E66.812 OBESITY, CLASS II, BMI 35-39.9: Primary | ICD-10-CM

## 2025-07-25 DIAGNOSIS — E78.1 HYPERTRIGLYCERIDEMIA: ICD-10-CM

## 2025-07-25 DIAGNOSIS — I10 PRIMARY HYPERTENSION: ICD-10-CM

## 2025-07-25 RX ORDER — TIRZEPATIDE 12.5 MG/.5ML
12.5 INJECTION, SOLUTION SUBCUTANEOUS WEEKLY
Qty: 2 ML | Refills: 2 | Status: SHIPPED | OUTPATIENT
Start: 2025-07-25 | End: 2025-10-17

## 2025-07-28 RX ORDER — TIRZEPATIDE 12.5 MG/.5ML
12.5 INJECTION, SOLUTION SUBCUTANEOUS WEEKLY
Qty: 6 ML | Refills: 0 | Status: SHIPPED | OUTPATIENT
Start: 2025-07-28 | End: 2025-10-20

## 2025-08-19 ENCOUNTER — NURSE TRIAGE (OUTPATIENT)
Age: 40
End: 2025-08-19

## 2025-08-20 ENCOUNTER — OFFICE VISIT (OUTPATIENT)
Dept: FAMILY MEDICINE CLINIC | Facility: CLINIC | Age: 40
End: 2025-08-20
Payer: COMMERCIAL

## 2025-08-20 ENCOUNTER — TELEPHONE (OUTPATIENT)
Age: 40
End: 2025-08-20

## 2025-08-20 VITALS
OXYGEN SATURATION: 99 % | BODY MASS INDEX: 29.38 KG/M2 | TEMPERATURE: 98.2 F | WEIGHT: 205.2 LBS | SYSTOLIC BLOOD PRESSURE: 118 MMHG | HEIGHT: 70 IN | HEART RATE: 82 BPM | DIASTOLIC BLOOD PRESSURE: 70 MMHG

## 2025-08-20 DIAGNOSIS — K62.5 BRBPR (BRIGHT RED BLOOD PER RECTUM): Primary | ICD-10-CM

## 2025-08-20 DIAGNOSIS — R39.198 DECREASED URINE STREAM: ICD-10-CM

## 2025-08-20 DIAGNOSIS — K62.89 ANAL PAIN: ICD-10-CM

## 2025-08-20 DIAGNOSIS — K59.03 DRUG-INDUCED CONSTIPATION: ICD-10-CM

## 2025-08-20 DIAGNOSIS — Z12.5 PROSTATE CANCER SCREENING: ICD-10-CM

## 2025-08-20 PROCEDURE — 99213 OFFICE O/P EST LOW 20 MIN: CPT

## 2025-08-20 RX ORDER — LIDOCAINE 40 MG/G
CREAM TOPICAL AS NEEDED
Qty: 5 G | Refills: 0 | Status: SHIPPED | OUTPATIENT
Start: 2025-08-20

## 2025-08-20 RX ORDER — HYDROCORTISONE 25 MG/G
CREAM TOPICAL 2 TIMES DAILY
Qty: 28 G | Refills: 0 | Status: SHIPPED | OUTPATIENT
Start: 2025-08-20

## (undated) DEVICE — INTENDED FOR TISSUE SEPARATION, AND OTHER PROCEDURES THAT REQUIRE A SHARP SURGICAL BLADE TO PUNCTURE OR CUT.: Brand: BARD-PARKER ® CARBON RIB-BACK BLADES

## (undated) DEVICE — GLOVE SRG BIOGEL 7.5

## (undated) DEVICE — CUP MEDICINE 1OZ 5000/CS 50/PLT: Brand: MEDEGEN MEDICAL PRODUCTS, LLC

## (undated) DEVICE — 4-PORT MANIFOLD: Brand: NEPTUNE 2

## (undated) DEVICE — ABDOMINAL PAD: Brand: DERMACEA

## (undated) DEVICE — TELFA NON-ADHERENT ABSORBENT DRESSING: Brand: TELFA

## (undated) DEVICE — GLOVE SRG BIOGEL 6.5

## (undated) DEVICE — SYRINGE 10ML LL

## (undated) DEVICE — SUT VICRYL 3-0 SH 27 IN J416H

## (undated) DEVICE — GLOVE SRG BIOGEL 7

## (undated) DEVICE — GLOVE INDICATOR PI UNDERGLOVE SZ 7.5 BLUE

## (undated) DEVICE — PLUMEPEN PRO 10FT

## (undated) DEVICE — SUT VICRYL 4-0 PS-2 18 IN J496G

## (undated) DEVICE — SUT VICRYL 0 CT-1 27 IN J260H

## (undated) DEVICE — GLOVE INDICATOR PI UNDERGLOVE SZ 6.5 BLUE

## (undated) DEVICE — VIOLET BRAIDED (POLYGLACTIN 910), SYNTHETIC ABSORBABLE SUTURE: Brand: COATED VICRYL

## (undated) DEVICE — BETHLEHEM UNIVERSAL MINOR GEN: Brand: CARDINAL HEALTH

## (undated) DEVICE — POV-IOD SOLUTION 4OZ BT

## (undated) DEVICE — GAUZE SPONGES,16 PLY: Brand: CURITY

## (undated) DEVICE — SUT ETHILON 3-0 INFS-1 30 IN 669H

## (undated) DEVICE — POOLE SUCTION HANDLE W/TUBING: Brand: CARDINAL HEALTH

## (undated) DEVICE — INTENDED FOR TISSUE SEPARATION, AND OTHER PROCEDURES THAT REQUIRE A SHARP SURGICAL BLADE TO PUNCTURE OR CUT.: Brand: BARD-PARKER SAFETY BLADES SIZE 10, STERILE

## (undated) DEVICE — SWABSTCK, BENZOIN TINCTURE, 1/PK, STRL: Brand: APLICARE

## (undated) DEVICE — NEEDLE 25G X 1 1/2

## (undated) DEVICE — SUT VICRYL 2-0 CT-1 27 IN J259H